# Patient Record
Sex: FEMALE | Race: WHITE
[De-identification: names, ages, dates, MRNs, and addresses within clinical notes are randomized per-mention and may not be internally consistent; named-entity substitution may affect disease eponyms.]

---

## 2017-12-04 ENCOUNTER — TRANSCRIPTION ENCOUNTER (OUTPATIENT)
Age: 57
End: 2017-12-04

## 2017-12-07 ENCOUNTER — TRANSCRIPTION ENCOUNTER (OUTPATIENT)
Age: 57
End: 2017-12-07

## 2019-02-08 ENCOUNTER — OUTPATIENT (OUTPATIENT)
Dept: OUTPATIENT SERVICES | Facility: HOSPITAL | Age: 59
LOS: 1 days | Discharge: HOME | End: 2019-02-08

## 2019-02-08 DIAGNOSIS — E87.5 HYPERKALEMIA: ICD-10-CM

## 2020-07-16 ENCOUNTER — INPATIENT (INPATIENT)
Facility: HOSPITAL | Age: 60
LOS: 1 days | Discharge: HOME | End: 2020-07-18
Attending: HOSPITALIST | Admitting: HOSPITALIST
Payer: COMMERCIAL

## 2020-07-16 VITALS
TEMPERATURE: 100 F | HEART RATE: 102 BPM | DIASTOLIC BLOOD PRESSURE: 86 MMHG | SYSTOLIC BLOOD PRESSURE: 186 MMHG | OXYGEN SATURATION: 97 % | RESPIRATION RATE: 18 BRPM

## 2020-07-16 LAB
ALBUMIN SERPL ELPH-MCNC: 4.9 G/DL — SIGNIFICANT CHANGE UP (ref 3.5–5.2)
ALP SERPL-CCNC: 83 U/L — SIGNIFICANT CHANGE UP (ref 30–115)
ALT FLD-CCNC: 38 U/L — SIGNIFICANT CHANGE UP (ref 0–41)
ANION GAP SERPL CALC-SCNC: 17 MMOL/L — HIGH (ref 7–14)
APTT BLD: 34.9 SEC — SIGNIFICANT CHANGE UP (ref 27–39.2)
AST SERPL-CCNC: 28 U/L — SIGNIFICANT CHANGE UP (ref 0–41)
BASOPHILS # BLD AUTO: 0.06 K/UL — SIGNIFICANT CHANGE UP (ref 0–0.2)
BASOPHILS NFR BLD AUTO: 0.4 % — SIGNIFICANT CHANGE UP (ref 0–1)
BILIRUB SERPL-MCNC: 0.3 MG/DL — SIGNIFICANT CHANGE UP (ref 0.2–1.2)
BUN SERPL-MCNC: 19 MG/DL — SIGNIFICANT CHANGE UP (ref 10–20)
CALCIUM SERPL-MCNC: 10.1 MG/DL — SIGNIFICANT CHANGE UP (ref 8.5–10.1)
CHLORIDE SERPL-SCNC: 96 MMOL/L — LOW (ref 98–110)
CHOLEST SERPL-MCNC: 200 MG/DL — SIGNIFICANT CHANGE UP (ref 100–200)
CO2 SERPL-SCNC: 23 MMOL/L — SIGNIFICANT CHANGE UP (ref 17–32)
CREAT SERPL-MCNC: 1.1 MG/DL — SIGNIFICANT CHANGE UP (ref 0.7–1.5)
EOSINOPHIL # BLD AUTO: 0.08 K/UL — SIGNIFICANT CHANGE UP (ref 0–0.7)
EOSINOPHIL NFR BLD AUTO: 0.6 % — SIGNIFICANT CHANGE UP (ref 0–8)
GLUCOSE BLDC GLUCOMTR-MCNC: 112 MG/DL — HIGH (ref 70–99)
GLUCOSE BLDC GLUCOMTR-MCNC: 133 MG/DL — HIGH (ref 70–99)
GLUCOSE SERPL-MCNC: 210 MG/DL — HIGH (ref 70–99)
HCT VFR BLD CALC: 40.9 % — SIGNIFICANT CHANGE UP (ref 37–47)
HDLC SERPL-MCNC: 33 MG/DL — LOW
HGB BLD-MCNC: 13.8 G/DL — SIGNIFICANT CHANGE UP (ref 12–16)
IMM GRANULOCYTES NFR BLD AUTO: 1 % — HIGH (ref 0.1–0.3)
INR BLD: 1 RATIO — SIGNIFICANT CHANGE UP (ref 0.65–1.3)
LIPID PNL WITH DIRECT LDL SERPL: 91 MG/DL — SIGNIFICANT CHANGE UP (ref 4–129)
LYMPHOCYTES # BLD AUTO: 18.5 % — LOW (ref 20.5–51.1)
LYMPHOCYTES # BLD AUTO: 2.47 K/UL — SIGNIFICANT CHANGE UP (ref 1.2–3.4)
MCHC RBC-ENTMCNC: 31.8 PG — HIGH (ref 27–31)
MCHC RBC-ENTMCNC: 33.7 G/DL — SIGNIFICANT CHANGE UP (ref 32–37)
MCV RBC AUTO: 94.2 FL — SIGNIFICANT CHANGE UP (ref 81–99)
MONOCYTES # BLD AUTO: 0.74 K/UL — HIGH (ref 0.1–0.6)
MONOCYTES NFR BLD AUTO: 5.5 % — SIGNIFICANT CHANGE UP (ref 1.7–9.3)
NEUTROPHILS # BLD AUTO: 9.86 K/UL — HIGH (ref 1.4–6.5)
NEUTROPHILS NFR BLD AUTO: 74 % — SIGNIFICANT CHANGE UP (ref 42.2–75.2)
NRBC # BLD: 0 /100 WBCS — SIGNIFICANT CHANGE UP (ref 0–0)
PLATELET # BLD AUTO: 179 K/UL — SIGNIFICANT CHANGE UP (ref 130–400)
POTASSIUM SERPL-MCNC: 4.9 MMOL/L — SIGNIFICANT CHANGE UP (ref 3.5–5)
POTASSIUM SERPL-SCNC: 4.9 MMOL/L — SIGNIFICANT CHANGE UP (ref 3.5–5)
PROT SERPL-MCNC: 7.6 G/DL — SIGNIFICANT CHANGE UP (ref 6–8)
PROTHROM AB SERPL-ACNC: 11.5 SEC — SIGNIFICANT CHANGE UP (ref 9.95–12.87)
RBC # BLD: 4.34 M/UL — SIGNIFICANT CHANGE UP (ref 4.2–5.4)
RBC # FLD: 13.2 % — SIGNIFICANT CHANGE UP (ref 11.5–14.5)
SODIUM SERPL-SCNC: 136 MMOL/L — SIGNIFICANT CHANGE UP (ref 135–146)
TOTAL CHOLESTEROL/HDL RATIO MEASUREMENT: 6.1 RATIO — HIGH (ref 4–5.5)
TRIGL SERPL-MCNC: 744 MG/DL — HIGH (ref 10–149)
TROPONIN T SERPL-MCNC: <0.01 NG/ML — SIGNIFICANT CHANGE UP
WBC # BLD: 13.34 K/UL — HIGH (ref 4.8–10.8)
WBC # FLD AUTO: 13.34 K/UL — HIGH (ref 4.8–10.8)

## 2020-07-16 PROCEDURE — 70450 CT HEAD/BRAIN W/O DYE: CPT | Mod: 26

## 2020-07-16 PROCEDURE — 99291 CRITICAL CARE FIRST HOUR: CPT

## 2020-07-16 PROCEDURE — 93010 ELECTROCARDIOGRAM REPORT: CPT

## 2020-07-16 PROCEDURE — 0042T: CPT

## 2020-07-16 PROCEDURE — 99497 ADVNCD CARE PLAN 30 MIN: CPT | Mod: 25

## 2020-07-16 PROCEDURE — 99223 1ST HOSP IP/OBS HIGH 75: CPT

## 2020-07-16 RX ORDER — ASPIRIN/CALCIUM CARB/MAGNESIUM 324 MG
325 TABLET ORAL ONCE
Refills: 0 | Status: COMPLETED | OUTPATIENT
Start: 2020-07-16 | End: 2020-07-16

## 2020-07-16 RX ORDER — ATORVASTATIN CALCIUM 80 MG/1
80 TABLET, FILM COATED ORAL AT BEDTIME
Refills: 0 | Status: DISCONTINUED | OUTPATIENT
Start: 2020-07-16 | End: 2020-07-18

## 2020-07-16 RX ORDER — INSULIN LISPRO 100/ML
VIAL (ML) SUBCUTANEOUS
Refills: 0 | Status: DISCONTINUED | OUTPATIENT
Start: 2020-07-16 | End: 2020-07-18

## 2020-07-16 RX ORDER — DEXTROSE 50 % IN WATER 50 %
12.5 SYRINGE (ML) INTRAVENOUS ONCE
Refills: 0 | Status: DISCONTINUED | OUTPATIENT
Start: 2020-07-16 | End: 2020-07-18

## 2020-07-16 RX ORDER — GEMFIBROZIL 600 MG
600 TABLET ORAL
Refills: 0 | Status: DISCONTINUED | OUTPATIENT
Start: 2020-07-16 | End: 2020-07-18

## 2020-07-16 RX ORDER — BENAZEPRIL HYDROCHLORIDE 40 MG/1
1 TABLET ORAL
Qty: 0 | Refills: 0 | DISCHARGE

## 2020-07-16 RX ORDER — SODIUM CHLORIDE 9 MG/ML
1000 INJECTION, SOLUTION INTRAVENOUS
Refills: 0 | Status: DISCONTINUED | OUTPATIENT
Start: 2020-07-16 | End: 2020-07-18

## 2020-07-16 RX ORDER — DEXTROSE 50 % IN WATER 50 %
25 SYRINGE (ML) INTRAVENOUS ONCE
Refills: 0 | Status: DISCONTINUED | OUTPATIENT
Start: 2020-07-16 | End: 2020-07-18

## 2020-07-16 RX ORDER — PANTOPRAZOLE SODIUM 20 MG/1
40 TABLET, DELAYED RELEASE ORAL
Refills: 0 | Status: DISCONTINUED | OUTPATIENT
Start: 2020-07-16 | End: 2020-07-18

## 2020-07-16 RX ORDER — CHLORHEXIDINE GLUCONATE 213 G/1000ML
1 SOLUTION TOPICAL
Refills: 0 | Status: DISCONTINUED | OUTPATIENT
Start: 2020-07-16 | End: 2020-07-18

## 2020-07-16 RX ORDER — ATORVASTATIN CALCIUM 80 MG/1
80 TABLET, FILM COATED ORAL ONCE
Refills: 0 | Status: COMPLETED | OUTPATIENT
Start: 2020-07-16 | End: 2020-07-16

## 2020-07-16 RX ORDER — LISINOPRIL 2.5 MG/1
10 TABLET ORAL DAILY
Refills: 0 | Status: DISCONTINUED | OUTPATIENT
Start: 2020-07-16 | End: 2020-07-16

## 2020-07-16 RX ORDER — DEXTROSE 50 % IN WATER 50 %
15 SYRINGE (ML) INTRAVENOUS ONCE
Refills: 0 | Status: DISCONTINUED | OUTPATIENT
Start: 2020-07-16 | End: 2020-07-18

## 2020-07-16 RX ORDER — ASPIRIN/CALCIUM CARB/MAGNESIUM 324 MG
1 TABLET ORAL
Qty: 0 | Refills: 0 | DISCHARGE

## 2020-07-16 RX ORDER — ENOXAPARIN SODIUM 100 MG/ML
40 INJECTION SUBCUTANEOUS AT BEDTIME
Refills: 0 | Status: DISCONTINUED | OUTPATIENT
Start: 2020-07-16 | End: 2020-07-16

## 2020-07-16 RX ORDER — ASPIRIN/CALCIUM CARB/MAGNESIUM 324 MG
81 TABLET ORAL DAILY
Refills: 0 | Status: DISCONTINUED | OUTPATIENT
Start: 2020-07-16 | End: 2020-07-18

## 2020-07-16 RX ORDER — INSULIN GLARGINE 100 [IU]/ML
14 INJECTION, SOLUTION SUBCUTANEOUS AT BEDTIME
Refills: 0 | Status: DISCONTINUED | OUTPATIENT
Start: 2020-07-16 | End: 2020-07-18

## 2020-07-16 RX ORDER — INSULIN LISPRO 100/ML
5 VIAL (ML) SUBCUTANEOUS
Refills: 0 | Status: DISCONTINUED | OUTPATIENT
Start: 2020-07-16 | End: 2020-07-18

## 2020-07-16 RX ORDER — METFORMIN HYDROCHLORIDE 850 MG/1
1 TABLET ORAL
Qty: 0 | Refills: 0 | DISCHARGE

## 2020-07-16 RX ORDER — GLUCAGON INJECTION, SOLUTION 0.5 MG/.1ML
1 INJECTION, SOLUTION SUBCUTANEOUS ONCE
Refills: 0 | Status: DISCONTINUED | OUTPATIENT
Start: 2020-07-16 | End: 2020-07-18

## 2020-07-16 RX ADMIN — ATORVASTATIN CALCIUM 80 MILLIGRAM(S): 80 TABLET, FILM COATED ORAL at 19:53

## 2020-07-16 RX ADMIN — Medication 325 MILLIGRAM(S): at 19:53

## 2020-07-16 NOTE — ED PROVIDER NOTE - CLINICAL SUMMARY MEDICAL DECISION MAKING FREE TEXT BOX
Pt evaluated after sign out. CT reviewed. Neuro evaluated pt and recommend admission to stroke unit. Will admit.

## 2020-07-16 NOTE — ED PROVIDER NOTE - CARE PLAN
Principal Discharge DX:	Numbness and tingling of left side of face Principal Discharge DX:	Numbness and tingling of left side of face  Secondary Diagnosis:	Cerebrovascular accident (CVA), unspecified mechanism

## 2020-07-16 NOTE — H&P ADULT - ATTENDING COMMENTS
PHYSICAL EXAM:    CONSTITUTIONAL: NAD  ENMT: EOMI, PERRLA, left eye watery discharge, No tonsillar erythema, exudates, or enlargement, neck supple, No JVD  PSYCH: Alert & Oriented X3  RESPIRATORY: Clear to percussion bilaterally; No rales, rhonchi, wheezing, or rubs  CARDIOVASCULAR: Regular rate and rhythm; No murmurs, rubs, or gallops, negative edema  GASTROINTESTINAL: Soft, Nontender, Nondistended; Bowel sounds present  EXTREMITIES:  2+ Peripheral Pulses, No clubbing, cyanosis  SKIN: No rashes or lesions  NEURO: Decreased strength in left eye closing CN VII involvement, minimal left lip drooping, left eye     61 yo F with PMHx of HTN, DM II, DLD presented with complaint of sudden onset left upper lip parensthesias since morning of presentation associated with intermittent left eye watery discharge for 3 days duration. Patient attributed symptoms to possible Bell's Palsy as father has had history of it. Patient denies headache, changes in vision, slurred speech, aphasia, insect/tick bites, remaining ROS unrevealing. Stroke code activated in ED, NIHSS 2 at time of arrival, deemed not a tPA candidate.     #TIA vs. CVA vs. Bell's Palsy: Neurology recommendations noted, Stroke unit monitoring, neuro checks per unit, s/p ASA 325mg in ED, continue ASA-81mg, high intensity statin, f/u MRI Brain (administer Ativan prior to imaging), 2D-Echo, f/u HgbA1C%, lipid panel noted, speech and swallow evaluation for need of modified diet, CT Brain perfusion reviewed, #Severe stenosis or occlusion of the A1 segment of the left TERENCE, moderate stenosis of the left vertebral artery proximal V4 segment, and calcific plaque of the carotid siphons with moderate left-sided stenoses: Awaiting Neurosurgery recommendations, f/u dedicated carotid duplex     #2 cm left thyroid nodule noted on CT imaging: please advise outpatient workup upon discharge     #DM II monitor fingersticks, basal bolus insulin if greater than 180    #HTN: lisinopril on hold for permissive hypertension, monitor blood pressures     #DLD: continue high intensity statin and lopid    Patient is full code.    Dispositon: Acute PHYSICAL EXAM:    CONSTITUTIONAL: NAD  ENMT: EOMI, PERRLA, left eye watery discharge, No tonsillar erythema, exudates, or enlargement, neck supple, No JVD  PSYCH: Alert & Oriented X3  RESPIRATORY: Clear to percussion bilaterally; No rales, rhonchi, wheezing, or rubs  CARDIOVASCULAR: Regular rate and rhythm; No murmurs, rubs, or gallops, negative edema  GASTROINTESTINAL: Soft, Nontender, Nondistended; Bowel sounds present  EXTREMITIES:  2+ Peripheral Pulses, No clubbing, cyanosis  SKIN: No rashes or lesions  NEURO: Decreased strength in left eye closing CN VII involvement, minimal left lip drooping, left eye     61 yo F with PMHx of HTN, DM II, DLD presented with complaint of sudden onset left upper lip parensthesias since morning of presentation associated with intermittent left eye watery discharge for 3 days duration. Patient attributed symptoms to possible Bell's Palsy as father has had history of it. Patient denies headache, changes in vision, slurred speech, aphasia, insect/tick bites, remaining ROS unrevealing. Stroke code activated in ED, NIHSS 2 at time of arrival, deemed not a tPA candidate.     #TIA vs. CVA vs. Bell's Palsy: Neurology recommendations noted, Stroke unit monitoring, neuro checks per unit, s/p ASA 325mg in ED, continue ASA-81mg, high intensity statin, f/u MRI Brain (administer Ativan prior to imaging), 2D-Echo, f/u HgbA1C%, lipid panel noted, speech and swallow evaluation for need of modified diet, CT Brain perfusion reviewed, #Severe stenosis or occlusion of the A1 segment of the left TERENCE, moderate stenosis of the left vertebral artery proximal V4 segment, and calcific plaque of the carotid siphons with moderate left-sided stenoses: Awaiting Neurosurgery recommendations, f/u dedicated carotid duplex     #2 cm left thyroid nodule noted on CT imaging: please advise outpatient workup upon discharge     #Leukocytosis, likely reactive: COVID-19 result pending, f/u UA    #DM II monitor fingersticks, basal bolus insulin if greater than 180    #HTN: lisinopril on hold for permissive hypertension, monitor blood pressures     #DLD: continue high intensity statin and lopid    Patient is full code.    Dispositon: Acute PHYSICAL EXAM:    CONSTITUTIONAL: NAD  ENMT: EOMI, PERRLA, left eye watery discharge, No tonsillar erythema, exudates, or enlargement, neck supple, No JVD  PSYCH: Alert & Oriented X3  RESPIRATORY: Clear to percussion bilaterally; No rales, rhonchi, wheezing, or rubs  CARDIOVASCULAR: Regular rate and rhythm; No murmurs, rubs, or gallops, negative edema  GASTROINTESTINAL: Soft, Nontender, Nondistended; Bowel sounds present  EXTREMITIES:  2+ Peripheral Pulses, No clubbing, cyanosis  SKIN: No rashes or lesions  NEURO: Decreased strength in left eye closing CN VII involvement, minimal left lip droopingm, sensation of bilateral face intact    61 yo F with PMHx of HTN, DM II, DLD presented with complaint of sudden onset left upper lip parensthesias since morning of presentation associated with intermittent left eye watery discharge for 3 days duration. Patient attributed symptoms to possible Bell's Palsy as father has had history of it. Patient denies headache, changes in vision, slurred speech, aphasia, insect/tick bites, remaining ROS unrevealing. Stroke code activated in ED, NIHSS 2 at time of arrival, deemed not a tPA candidate.     #TIA vs. CVA vs. Bell's Palsy: Neurology recommendations noted, Stroke unit monitoring, neuro checks per unit, s/p ASA 325mg in ED, continue ASA-81mg, high intensity statin, f/u MRI Brain (administer Ativan prior to imaging), 2D-Echo, f/u HgbA1C%, lipid panel noted, speech and swallow evaluation for need of modified diet, CT Brain perfusion reviewed, #Severe stenosis or occlusion of the A1 segment of the left TERENCE, moderate stenosis of the left vertebral artery proximal V4 segment, and calcific plaque of the carotid siphons with moderate left-sided stenoses: Awaiting Neurosurgery recommendations, f/u dedicated carotid duplex     #2 cm left thyroid nodule noted on CT imaging: please advise outpatient workup upon discharge     #Leukocytosis, likely reactive: COVID-19 result pending, f/u UA    #DM II monitor fingersticks, basal bolus insulin if greater than 180    #HTN: lisinopril on hold for permissive hypertension, monitor blood pressures     #DLD: continue high intensity statin and lopid    Patient is full code.    Dispositon: Acute

## 2020-07-16 NOTE — H&P ADULT - NSICDXFAMILYHX_GEN_ALL_CORE_FT
FAMILY HISTORY:  FH: type 2 diabetes, father FAMILY HISTORY:  FH: type 2 diabetes, father    Father  Still living? Unknown  Family history of Bell's palsy, Age at diagnosis: Age Unknown

## 2020-07-16 NOTE — H&P ADULT - NSHPPHYSICALEXAM_GEN_ALL_CORE
PHYSICAL EXAM:  GENERAL: NAD, lying in bed comfortably  HEAD:  Atraumatic, Normocephalic; able to wrinkle forehead  EYES: EOMI, PERRLA, conjunctiva and sclera clear; left eye unable to close fully  ENT: Moist mucous membranes; left sided facial droop  NECK: Supple, No JVD  CHEST/LUNG: Clear to auscultation bilaterally; No rales, rhonchi, wheezing, or rubs. Unlabored respirations  HEART: Regular rate and rhythm; No murmurs, rubs, or gallops  ABDOMEN: Bowel sounds present; Soft, Nontender, Nondistended. No hepatomegaly  EXTREMITIES:  2+ Peripheral Pulses, brisk capillary refill. No clubbing, cyanosis, or edema  NERVOUS SYSTEM:  Alert & Oriented X3, speech clear. decreased sensation to pinprick on left side of face   MSK: FROM all 4 extremities, full and equal strength  SKIN: No rashes or lesions

## 2020-07-16 NOTE — ED PROVIDER NOTE - PROGRESS NOTE DETAILS
Dr. Duran: Sign out to Dr. Preciado  Patient pending neuro recommendations. Pt evaluated after sign out. CT reviewed. Neuro evaluated pt and recommend admission to stroke unit. Will admit.

## 2020-07-16 NOTE — H&P ADULT - NSHPREVIEWOFSYSTEMS_GEN_ALL_CORE
REVIEW OF SYSTEMS:    CONSTITUTIONAL: +weakness/numbness in left side of face  EYES/ENT: No visual changes;  No vertigo or throat pain   NECK: No pain or stiffness  RESPIRATORY: No cough, wheezing, hemoptysis; No shortness of breath  CARDIOVASCULAR: No chest pain or palpitations  GASTROINTESTINAL: No abdominal or epigastric pain. No nausea, vomiting, or hematemesis; No diarrhea or constipation. No melena or hematochezia.  GENITOURINARY: No dysuria, frequency or hematuria  NEUROLOGICAL: +facial droop  SKIN: No itching, rashes

## 2020-07-16 NOTE — H&P ADULT - ASSESSMENT
61 yo female with pmh of HTN, DM, DLD presents with acute onset of left lower facial weakness and numbness since this morning. Stroke code was called, NIHSS was 2 for facial weakness and numbness. CTH is negative for acute pathology and CTP showed no penumbra, CTA with multifocal intracranial stenosis. LWK is 10 am, so she was not a tPA candidate.     #R/o stroke  -s/p 325 asa, 80 statin in ed  -start asa 81mg, statin 80  -imaging reviewed  -admit to stroke unit  -q2 neuro checks  -keep syst 140-160  -MR brain NC; give ativan 2mg before going  -TTE with bubble study  -lipid profile, hba1c  -speech/swallow eval pending; npo   -neurosx consult for severe stenosis or occlusion of the A1 segment of the left TERENCE    #DM2  -lantus 14qhs, lispro 5 qac  -f/u fs  -avoid hypoglycemia  -check a1c     #HTN  -lisinopril 20mg  -keep syst 140-160    #DLD  -lipitor 80mg q24h  -continue gemfibrozil    #DVT PPx- LVX 40sc qhs  #GI PPx- Protonix 40mg qd  #Diet- NPO pending sp/sw  #CHG  #Activity- Bedrest  #Dispo- Stroke unit  #Code- FULL 61 yo female with pmh of HTN, DM, DLD presents with acute onset of left lower facial weakness and numbness since this morning. Stroke code was called, NIHSS was 2 for facial weakness and numbness. CTH is negative for acute pathology and CTP showed no penumbra, CTA with multifocal intracranial stenosis. LWK is 10 am, so she was not a tPA candidate.     #R/o stroke  -s/p 325 asa, 80 statin in ed  -start asa 81mg, statin 80  -imaging reviewed  -admit to stroke unit  -q2 neuro checks  -keep syst 140-160  -MR brain NC; give ativan 2mg before going  -TTE with bubble study  -lipid profile, hba1c  -speech/swallow eval pending; npo   -neurosx consult for severe stenosis or occlusion of the A1 segment of the left TERENCE    #DM2  -lantus 14qhs, lispro 5 qac  -f/u fs  -avoid hypoglycemia  -check a1c     #HTN  -lisinopril 10mg qd  -keep syst 140-160    #DLD  -lipitor 80mg q24h  -continue gemfibrozil    #2cm left thyroid nodule  -f/u as outpatient, will need thyroid u/s    #DVT PPx- LVX 40sc qhs  #GI PPx- Protonix 40mg qd  #Diet- NPO pending sp/sw  #CHG  #Activity- Bedrest  #Dispo- Stroke unit  #Code- FULL 61 yo female with pmh of HTN, DM, DLD presents with acute onset of left lower facial weakness and numbness since this morning. Stroke code was called, NIHSS was 2 for facial weakness and numbness. CTH is negative for acute pathology and CTP showed no penumbra, CTA with multifocal intracranial stenosis. LWK is 10 am, so she was not a tPA candidate.     #R/o stroke  -s/p 325 asa, 80 statin in ed  -start asa 81mg, statin 80  -imaging reviewed  -admit to stroke unit  -q1 neuro checks  -keep syst 140-160  -MR brain NC; give ativan 2mg before going  -TTE with bubble study  -lipid profile, hba1c  -speech/swallow eval pending; npo   -neurosx consult for severe stenosis or occlusion of the A1 segment of the left TERENCE    #DM2  -lantus 14qhs, lispro 5 qac  -f/u fs  -avoid hypoglycemia  -check a1c     #HTN  -lisinopril 10mg qd  -keep syst 140-160    #DLD  -lipitor 80mg q24h  -continue gemfibrozil    #2cm left thyroid nodule  -f/u as outpatient, will need thyroid u/s    #DVT PPx- LVX 40sc qhs  #GI PPx- Protonix 40mg qd  #Diet- NPO pending sp/sw  #CHG  #Activity- Bedrest  #Dispo- Stroke unit  #Code- FULL 59 yo female with pmh of HTN, DM, DLD presents with acute onset of left lower facial weakness and numbness since this morning. Stroke code was called, NIHSS was 2 for facial weakness and numbness. CTH is negative for acute pathology and CTP showed no penumbra, CTA with multifocal intracranial stenosis. LWK is 10 am, so she was not a tPA candidate.     #R/o stroke  -s/p 325 asa, 80 statin in ed  -start asa 81mg, statin 80  -imaging reviewed  -admit to stroke unit  -q1 neuro checks  -keep syst 140-160  -MR brain NC; give ativan 2mg before going  -TTE with bubble study  -lipid profile, hba1c  -speech/swallow eval pending; npo   -neurosx consult for severe stenosis or occlusion of the A1 segment of the left TERENCE    #DM2  -lantus 14qhs, lispro 5 qac  -f/u fs  -avoid hypoglycemia  -check a1c     #HTN  -lisinopril 10mg qd *on hold  -keep syst 140-160    #DLD  -lipitor 80mg q24h  -continue gemfibrozil    #2cm left thyroid nodule  -f/u as outpatient, will need thyroid u/s    #DVT PPx- LVX 40sc qhs  #GI PPx- Protonix 40mg qd  #Diet- NPO pending sp/sw  #CHG  #Activity- Bedrest  #Dispo- Stroke unit  #Code- FULL 59 yo female with pmh of HTN, DM, DLD presents with acute onset of left lower facial weakness and numbness since this morning. Stroke code was called, NIHSS was 2 for facial weakness and numbness. CTH is negative for acute pathology and CTP showed no penumbra, CTA with multifocal intracranial stenosis. LWK is 10 am, so she was not a tPA candidate.     #R/o stroke  -s/p 325 asa, 80 statin in ed  -start asa 81mg, statin 80  -imaging reviewed  -admit to stroke unit  -q1 neuro checks  -keep syst 140-160  -MR brain NC; give ativan 2mg before going  -TTE with bubble study  -lipid profile, hba1c  -speech/swallow eval pending; npo   -neurosx consult for severe stenosis or occlusion of the A1 segment of the left TERENCE    #DM2  -lantus 14qhs, lispro 5 qac  -f/u fs  -avoid hypoglycemia  -check a1c     #HTN  -lisinopril 10mg qd *on hold  -keep syst 140-160    #DLD  -lipitor 80mg q24h  -continue gemfibrozil    #2cm left thyroid nodule  -f/u as outpatient, will need thyroid u/s    #DVT PPx- *anti thrombotic sequentials   #GI PPx- Protonix 40mg qd  #Diet- NPO pending sp/sw  #CHG  #Activity- *Increase as tolerated  #Dispo- Stroke unit  #Code- FULL

## 2020-07-16 NOTE — ED PROVIDER NOTE - ATTENDING CONTRIBUTION TO CARE
61 yo F with PMH of HTN, DLD presents to ED for L sided numbness that began at 10am. Patient states over the past 3 days her eye has been tearing. No slurred speak, no weakness. No fever, chills.     Const: Well nourished, well developed, appears stated age  Eyes: tearing L eye   HENT:  Neck supple without meningismus   CV: RRR, Warm, well-perfused extremities  RESP: CTA B/L, no tachypnea   GI: soft, non-tender, non-distended  MSK: No gross deformities appreciated  Skin: Warm, dry. No rashes  Neuro: Alert, CNs II-XII grossly intact. Sensation and motor function of extremities grossly intact. No facial droop.  Psych: Appropriate mood and affect.    Stroke code

## 2020-07-16 NOTE — ED PROVIDER NOTE - OBJECTIVE STATEMENT
Pt is a 60y w/ no PMHX of HTN, DLD presenting w/ Left sided facial numbness since 10am. Of note pt has also noted tearing of her L eye for the last 3 days. Denies any n/v/fevers ,chills, no headache, or neck stiffness. No vision changes. No previous trauma. Pt is a 60y w/ no PMHX of HTN, DLD presenting w/ Left sided facial numbness since 10am. Of note pt has also noted tearing of her L eye for the last 3 days. No focal weakness, no numbness in the extremetie. Denies any n/v/fevers ,chills, no headache, or neck stiffness. No vision changes. No previous trauma.

## 2020-07-16 NOTE — H&P ADULT - NSHPLABSRESULTS_GEN_ALL_CORE
07-16    136  |  96<L>  |  19  ----------------------------<  210<H>  4.9   |  23  |  1.1    Ca    10.1      16 Jul 2020 16:25    TPro  7.6  /  Alb  4.9  /  TBili  0.3  /  DBili  x   /  AST  28  /  ALT  38  /  AlkPhos  83  07-16    CBC Full  -  ( 16 Jul 2020 16:25 )  WBC Count : 13.34 K/uL  RBC Count : 4.34 M/uL  Hemoglobin : 13.8 g/dL  Hematocrit : 40.9 %  Platelet Count - Automated : 179 K/uL  Mean Cell Volume : 94.2 fL  Mean Cell Hemoglobin : 31.8 pg  Mean Cell Hemoglobin Concentration : 33.7 g/dL  Auto Neutrophil # : 9.86 K/uL  Auto Lymphocyte # : 2.47 K/uL  Auto Monocyte # : 0.74 K/uL  Auto Eosinophil # : 0.08 K/uL  Auto Basophil # : 0.06 K/uL  Auto Neutrophil % : 74.0 %  Auto Lymphocyte % : 18.5 %  Auto Monocyte % : 5.5 %  Auto Eosinophil % : 0.6 %  Auto Basophil % : 0.4 %    PT/INR - ( 16 Jul 2020 16:25 )   PT: 11.50 sec;   INR: 1.00 ratio         PTT - ( 16 Jul 2020 16:25 )  PTT:34.9 sec    Lipid Profile (07.16.20 @ 16:25)    Total Cholesterol/HDL Ratio Measurement: 6.1 Ratio    Cholesterol, Serum: 200 mg/dL    Triglycerides, Serum: 744 mg/dL    HDL Cholesterol, Serum: 33: HDL Levels >/= 60 mg/dL are considered beneficial and a "negative" risk  factor.  Effective 08/15/2018: New reference range and interpretive comment. mg/dL    Direct LDL: 91: LDL Cholesterol (mg/dL) --- Interpretive Comment (for adults 18 and over)  Optimal LDL Level may vary based on clinical situation  Below 70                   Ideal for people at very high risk of heart  disease  Below 100                  Ideal for people at risk of heart disease  100 - 129                    [place]  130 - 159                    Borderline high  160 - 189                    High  190 and Above           Very high mg/dL    Troponin T, Serum: <0.01 ng/mL (07.16.20 @ 16:25)    < from: 12 Lead ECG (07.16.20 @ 17:16) >    Diagnosis Line Normal sinus rhythm  Normal ECG    < end of copied text >    < from: CT Brain Stroke Protocol (07.16.20 @ 16:36) >    IMPRESSION:  No acute intracranial pathology. No evidence of midline shift, mass effect or intracranial hemorrhage.    Enlarged pituitary gland which measures approximately 1.0 cm in craniocaudal extent possibly representing an underlying adenoma. Follow-up dynamic MRI of the sella can be obtained for further evaluation.    These findings were discussed with ANALI Olvera of stroke neurology at 7/16/2020 4:38 PM by Dr. Zaragoza with read back confirmation.        < end of copied text >    < from: CT Perfusion w/ Maps w/ IV Cont (07.16.20 @ 16:45) >    IMPRESSION:    1.  Severe stenosis or occlusion of the A1 segment of the left TERENCE. There is opacification of the distal A1 segment and the A2 segment via the anterior communicating artery. No corresponding perfusionabnormality is noted, and this may be a chronic finding.    2.  Otherwise no evidence of major vascular stenosis or occlusion.    3.  Scattered atheromatous changes including mild (<50%) stenosis of the left ICA origin, moderate stenosis of the left vertebral artery proximal V4 segment, and calcific plaque of the carotid siphons with moderate left-sided stenoses.    4.  2 cm left thyroid nodule, recommend further evaluation with thyroid sonogram on an outpatient/elective basis.    < end of copied text >

## 2020-07-16 NOTE — ED PROVIDER NOTE - PHYSICAL EXAMINATION
CONSTITUTIONAL: Well-developed; well-nourished; in no acute distress.   SKIN: warm, dry  HEAD: Normocephalic; atraumatic.  EYES: PERRL, EOMI, normal sclera and conjunctiva   ENT: No nasal discharge; airway clear.  NECK: Supple; non tender.  CARD:  Regular rate and rhythm.   RESP: NO inc WOB   ABD: soft ntnd  EXT: Normal ROM.    LYMPH: No acute cervical adenopathy.  NEURO: Alert, oriented, grossly unremarkable  PSYCH: Cooperative, appropriate.  NEURO: AAO x 3, normal speech, no facial asymmetry, negative pronator drift, no ataxia, negative Romberg, no dysdiadokinesia, no nystagmus, peripheral vision intact, sensory equal and intact.     Decreased sensation in the L side of the face compared to R.   NIHSS 1

## 2020-07-16 NOTE — H&P ADULT - NSHPSOCIALHISTORY_GEN_ALL_CORE
, lives with son and   current smoker, 1/2ppd for 30 years  no alcohol or drugs , lives with son and   current smoker, 1/2ppd for 30 years  no alcohol or illicit drugs

## 2020-07-16 NOTE — H&P ADULT - HISTORY OF PRESENT ILLNESS
61 yo female with pmh of HTN, DM, DLD presents with acute onset of left lower facial weakness and numbness since this morning. She said yesterday her left eye began tearing, was unable to close it. This morning, around 10AM, she began to feel like the left side of her face was numb and her lip was drooping. She denies any chest pain, palpitations, dizziness, headaches, incontinence, abdominal pain, fevers, diarrhea, nausea, vomiting, vision or hearing changes. She has never had symptoms like this before.  ED Course:   T 100F, P 102, /86, R 18, S 97% on RA at rest. Stroke code was called, NIHSS was 2 for facial weakness and numbness. CTH is negative for acute pathology and CTP showed no penumbra, CTA with multifocal intracranial stenosis.  LWK is 10 am, so she was not a tPA candidate. Loaded with aspirin and statin in ED.

## 2020-07-17 LAB
A1C WITH ESTIMATED AVERAGE GLUCOSE RESULT: 8.1 % — HIGH (ref 4–5.6)
ALBUMIN SERPL ELPH-MCNC: 4.5 G/DL — SIGNIFICANT CHANGE UP (ref 3.5–5.2)
ALP SERPL-CCNC: 75 U/L — SIGNIFICANT CHANGE UP (ref 30–115)
ALT FLD-CCNC: 31 U/L — SIGNIFICANT CHANGE UP (ref 0–41)
ANION GAP SERPL CALC-SCNC: 11 MMOL/L — SIGNIFICANT CHANGE UP (ref 7–14)
AST SERPL-CCNC: 24 U/L — SIGNIFICANT CHANGE UP (ref 0–41)
BASOPHILS # BLD AUTO: 0.06 K/UL — SIGNIFICANT CHANGE UP (ref 0–0.2)
BASOPHILS NFR BLD AUTO: 0.5 % — SIGNIFICANT CHANGE UP (ref 0–1)
BILIRUB SERPL-MCNC: 0.6 MG/DL — SIGNIFICANT CHANGE UP (ref 0.2–1.2)
BUN SERPL-MCNC: 16 MG/DL — SIGNIFICANT CHANGE UP (ref 10–20)
CALCIUM SERPL-MCNC: 9.8 MG/DL — SIGNIFICANT CHANGE UP (ref 8.5–10.1)
CHLORIDE SERPL-SCNC: 98 MMOL/L — SIGNIFICANT CHANGE UP (ref 98–110)
CHOLEST SERPL-MCNC: 180 MG/DL — SIGNIFICANT CHANGE UP (ref 100–200)
CO2 SERPL-SCNC: 29 MMOL/L — SIGNIFICANT CHANGE UP (ref 17–32)
CREAT SERPL-MCNC: 0.9 MG/DL — SIGNIFICANT CHANGE UP (ref 0.7–1.5)
EOSINOPHIL # BLD AUTO: 0.1 K/UL — SIGNIFICANT CHANGE UP (ref 0–0.7)
EOSINOPHIL NFR BLD AUTO: 0.8 % — SIGNIFICANT CHANGE UP (ref 0–8)
ESTIMATED AVERAGE GLUCOSE: 186 MG/DL — HIGH (ref 68–114)
GLUCOSE BLDC GLUCOMTR-MCNC: 178 MG/DL — HIGH (ref 70–99)
GLUCOSE BLDC GLUCOMTR-MCNC: 254 MG/DL — HIGH (ref 70–99)
GLUCOSE BLDC GLUCOMTR-MCNC: 256 MG/DL — HIGH (ref 70–99)
GLUCOSE SERPL-MCNC: 143 MG/DL — HIGH (ref 70–99)
HCT VFR BLD CALC: 38.3 % — SIGNIFICANT CHANGE UP (ref 37–47)
HCV AB S/CO SERPL IA: 0.04 COI — SIGNIFICANT CHANGE UP
HCV AB SERPL-IMP: SIGNIFICANT CHANGE UP
HDLC SERPL-MCNC: 30 MG/DL — LOW
HGB BLD-MCNC: 13.1 G/DL — SIGNIFICANT CHANGE UP (ref 12–16)
IMM GRANULOCYTES NFR BLD AUTO: 0.9 % — HIGH (ref 0.1–0.3)
LIPID PNL WITH DIRECT LDL SERPL: 90 MG/DL — SIGNIFICANT CHANGE UP (ref 4–129)
LYMPHOCYTES # BLD AUTO: 2.79 K/UL — SIGNIFICANT CHANGE UP (ref 1.2–3.4)
LYMPHOCYTES # BLD AUTO: 23.6 % — SIGNIFICANT CHANGE UP (ref 20.5–51.1)
MAGNESIUM SERPL-MCNC: 2.1 MG/DL — SIGNIFICANT CHANGE UP (ref 1.8–2.4)
MCHC RBC-ENTMCNC: 31.4 PG — HIGH (ref 27–31)
MCHC RBC-ENTMCNC: 34.2 G/DL — SIGNIFICANT CHANGE UP (ref 32–37)
MCV RBC AUTO: 91.8 FL — SIGNIFICANT CHANGE UP (ref 81–99)
MONOCYTES # BLD AUTO: 0.83 K/UL — HIGH (ref 0.1–0.6)
MONOCYTES NFR BLD AUTO: 7 % — SIGNIFICANT CHANGE UP (ref 1.7–9.3)
NEUTROPHILS # BLD AUTO: 7.92 K/UL — HIGH (ref 1.4–6.5)
NEUTROPHILS NFR BLD AUTO: 67.2 % — SIGNIFICANT CHANGE UP (ref 42.2–75.2)
NRBC # BLD: 0 /100 WBCS — SIGNIFICANT CHANGE UP (ref 0–0)
PLATELET # BLD AUTO: 165 K/UL — SIGNIFICANT CHANGE UP (ref 130–400)
POTASSIUM SERPL-MCNC: 5.1 MMOL/L — HIGH (ref 3.5–5)
POTASSIUM SERPL-SCNC: 5.1 MMOL/L — HIGH (ref 3.5–5)
PROT SERPL-MCNC: 6.9 G/DL — SIGNIFICANT CHANGE UP (ref 6–8)
RBC # BLD: 4.17 M/UL — LOW (ref 4.2–5.4)
RBC # FLD: 13.1 % — SIGNIFICANT CHANGE UP (ref 11.5–14.5)
SARS-COV-2 RNA SPEC QL NAA+PROBE: SIGNIFICANT CHANGE UP
SODIUM SERPL-SCNC: 138 MMOL/L — SIGNIFICANT CHANGE UP (ref 135–146)
TOTAL CHOLESTEROL/HDL RATIO MEASUREMENT: 6 RATIO — HIGH (ref 4–5.5)
TRIGL SERPL-MCNC: 540 MG/DL — HIGH (ref 10–149)
WBC # BLD: 11.81 K/UL — HIGH (ref 4.8–10.8)
WBC # FLD AUTO: 11.81 K/UL — HIGH (ref 4.8–10.8)

## 2020-07-17 PROCEDURE — 93880 EXTRACRANIAL BILAT STUDY: CPT | Mod: 26

## 2020-07-17 PROCEDURE — 99233 SBSQ HOSP IP/OBS HIGH 50: CPT

## 2020-07-17 PROCEDURE — 70551 MRI BRAIN STEM W/O DYE: CPT | Mod: 26

## 2020-07-17 PROCEDURE — 93306 TTE W/DOPPLER COMPLETE: CPT | Mod: 26

## 2020-07-17 RX ORDER — HEPARIN SODIUM 5000 [USP'U]/ML
5000 INJECTION INTRAVENOUS; SUBCUTANEOUS EVERY 12 HOURS
Refills: 0 | Status: DISCONTINUED | OUTPATIENT
Start: 2020-07-17 | End: 2020-07-18

## 2020-07-17 RX ADMIN — Medication 3: at 12:50

## 2020-07-17 RX ADMIN — Medication 600 MILLIGRAM(S): at 12:50

## 2020-07-17 RX ADMIN — Medication 5 UNIT(S): at 17:58

## 2020-07-17 RX ADMIN — PANTOPRAZOLE SODIUM 40 MILLIGRAM(S): 20 TABLET, DELAYED RELEASE ORAL at 12:50

## 2020-07-17 RX ADMIN — ATORVASTATIN CALCIUM 80 MILLIGRAM(S): 80 TABLET, FILM COATED ORAL at 21:28

## 2020-07-17 RX ADMIN — Medication 81 MILLIGRAM(S): at 12:50

## 2020-07-17 RX ADMIN — Medication 1: at 17:58

## 2020-07-17 RX ADMIN — Medication 5 UNIT(S): at 12:50

## 2020-07-17 RX ADMIN — HEPARIN SODIUM 5000 UNIT(S): 5000 INJECTION INTRAVENOUS; SUBCUTANEOUS at 17:57

## 2020-07-17 RX ADMIN — INSULIN GLARGINE 14 UNIT(S): 100 INJECTION, SOLUTION SUBCUTANEOUS at 21:28

## 2020-07-17 NOTE — SWALLOW BEDSIDE ASSESSMENT ADULT - SWALLOW EVAL: RECOMMENDED FEEDING/EATING TECHNIQUES
oral hygiene/position upright (90 degrees)/eat/drink on unaffected side (Right), continue use of tongue sweep as needed for left sided residue

## 2020-07-17 NOTE — CONSULT NOTE ADULT - SUBJECTIVE AND OBJECTIVE BOX
Stroke Progress Note:    ASHLEY MIRANDA    1. Chief Complaint: left facial palsy and numbness    HPI: 59 yo female with pmh of HTN, DM, DLD presents with acute onset of left lower facial weakness and numbness since this morning. 2 days prior she noted to have watery discharge from her left eye and currently she feels its different from her right.   IN ED stroke code was called, NIHSS is 2 for facial weakness and numbness. She also has mild upper left facial weakness as well. CTH is negative for acute pathology and CTP showed no penumbra, CTA with multifocal intracranial stenosis, see below.   LWK is 10 am, so she is not a tPA candidate.      2. Relevant PMH:   Prior ischemic stroke/TIA[ ], Afib [ ], CAD [ ], HTN [x ], DLD [x ], DM [ x], PVD [ ], Obesity [ ],   Sedentary lifestyle [ ], CHF [ ], ADA [ ], Cancer Hx [ ].    3. Social History: Smoking [ ], Drug Use [ ], Alcohol Use [ ], Other [ ]    4. Possible Location of Stroke:    5. Relevant Brain Tissue Imagin. Relevant Cerebrovascular Imaging:         CT Perfusion w/ Maps w/ IV Cont:   EXAM:  CT PERFUSION W MAPS IC            PROCEDURE DATE:  2020            INTERPRETATION:  Clinical History / Reason for exam: Stroke code    Technique: CT angiogram of the head and neck including perfusion study of the brain.  Contiguous CT axial images of the head and neck were obtained following the bolus intravenous administration of 120 cc Optiray with multiple 3-D and MIP reformats with perfusion mapping performed on a separate 3D workstation ([id number]).    Correlation made with accompanying noncontrast head CT.    Findings:    CTA neck:   The visualized aortic arch and great vessel origins are patent.      The right common, internal and external carotid arteries are patent.    The left common, internal and external carotid arteries are patent. There is calcific plaque along the posterior wall of the left carotid bulb with mild (less than 50%) stenosis.    The vertebral arteries are patent, with mild stenoses of the left vertebral artery throughout its cervical course.    CTA brain: There is calcific plaque of the carotid siphons with moderate stenoses of the left precavernous, cavernous and supraclinoid segments, and mild stenosis of the right supraclinoid segment.    The right TERENCE and MCA are patent.    The left MCA is patent. There is severe stenosis or occlusion of the proximal A1 segment of the left TERENCE.    The distal vertebral arteries demonstrate atheromatous changes with mild stenosis of the right proximal V4 segment and moderate stenosis of the left proximal V4 segment. The basilar artery is patent. The posterior cerebral arteries are patent.    Perfusion: Apparent perfusion abnormality in the right posterior fossa with Tmax >6s volume of 7 cc is likely artifactual. There is no evidence of diminished cerebral blood flow.    Other:   Moderate-severe cervical spine degenerative changes  2 cm left thyroid lobe nodule.  Partially visualized 3.4 cm ovoid subcutaneous lesion within the back to the right of midline, recommend correlation with exam.  Redemonstrated mild pituitary prominence.    IMPRESSION:    1.  Severe stenosis or occlusion of the A1 segment of the left TERENCE. There is opacification of the distal A1 segment and the A2 segment via the anterior communicating artery. No corresponding perfusionabnormality is noted, and this may be a chronic finding.    2.  Otherwise no evidence of major vascular stenosis or occlusion.    3.  Scattered atheromatous changes including mild (<50%) stenosis of the left ICA origin, moderate stenosis of the left vertebral artery proximal V4 segment, and calcific plaque of the carotid siphons with moderate left-sided stenoses.    4.  2 cm left thyroid nodule, recommend further evaluation with thyroid sonogram on an outpatient/elective basis.        CHRISTOPHE MAI M.D., ATTENDING RADIOLOGIST  This document has been electronically signed. 2020  5:30PM             (20 @ 16:45)         7. Relevant blood tests: Direct LDL: 91 mg/dL [4 - 129] (20 @ 16:25)       8. Relevant cardiac rhythm monitorin. Relevant Cardiac Structure: (TTE/ANTONIO +/-):[ ]No intracardiac thrombus/[ ] no vegetation/[ ]no [place]/EF:    Home Medications:      MEDICATIONS  (STANDING):      10. PT/OT/Speech/Rehab/S&Sw/ Cognitive eval results and recommendations:    11. Exam:    Vital Signs Last 24 Hrs  T(C): 37.8 (2020 16:07), Max: 37.8 (2020 16:07)  T(F): 100 (2020 16:07), Max: 100 (2020 16:07)  HR: 102 (2020 16:45) (102 - 102)  BP: 159/72 (2020 16:45) (159/72 - 186/86)  BP(mean): --  RR: 18 (2020 16:45) (18 - 18)  SpO2: 99% (2020 16:45) (97% - 99%)    12.   NIH STROKE SCALE  Item	                                                        Score  1 a.	Level of Consciousness	               	0  1 b. LOC Questions	                                0  1 c.	LOC Commands	                               	0  2.	Best Gaze	                                        0  3.	Visual	                                                0  4.	Facial Palsy	                                        1  5 a.	Motor Arm - Left	                                0  5 b.	Motor Arm - Right	                        0  6 a.	Motor Leg - Left	                                0  6 b.	Motor Leg - Right	                                0  7.	Limb Ataxia	                                        0  8.	Sensory	                                                1  9.	Language	                                        0  10.	Dysarthria	                                        0  11.	Extinction and Inattention  	        0  ______________________________________  TOTAL	                                                        2    Total NIHSS on admission:      NIHSS yesterday:      NIHSS today: 2    mRS:  0 No symptoms at all  1 No significant disability despite symptoms; able to carry out all usual duties and activities without assistance  2 Slight disability; unable to carry out all previous activities, but able to look after own affairs  3 Moderate disability; requiring some help, but able to walk without assistance  4 Moderately severe disability; unable to walk without assistance and unable to attend to own bodily needs without assistance  5 Severe disability; bedridden, incontinent and requiring constant nursing care and attention  6 Dead
HPI:  61 yo female with pmh of HTN, DM, DLD presents with acute onset of left lower facial weakness and numbness since this morning. She said yesterday her left eye began tearing, was unable to close it. This morning, around 10AM, she began to feel like the left side of her face was numb and her lip was drooping. She denies any chest pain, palpitations, dizziness, headaches, incontinence, abdominal pain, fevers, diarrhea, nausea, vomiting, vision or hearing changes. She has never had symptoms like this before.  ED Course:   T 100F, P 102, /86, R 18, S 97% on RA at rest. Stroke code was called, NIHSS was 2 for facial weakness and numbness. CTH is negative for acute pathology and CTP showed no penumbra, CTA with multifocal intracranial stenosis.  LWK is 10 am, so she was not a tPA candidate. Loaded with aspirin and statin in ED. (16 Jul 2020 22:01)      PAST MEDICAL & SURGICAL HISTORY:  Diabetes  Hypertension  Hyperlipidemia  No significant past surgical history      Hospital Course:    TODAY'S SUBJECTIVE & REVIEW OF SYMPTOMS:     Constitutional WNL   Cardio WNL   Resp WNL   GI WNL  Heme WNL  Endo WNL  Skin WNL  MSK WNL  Neuro left facial droop/ numbness  Cognitive WNL  Psych WNL      MEDICATIONS  (STANDING):  aspirin  chewable 81 milliGRAM(s) Oral daily  atorvastatin 80 milliGRAM(s) Oral at bedtime  chlorhexidine 4% Liquid 1 Application(s) Topical <User Schedule>  dextrose 5%. 1000 milliLiter(s) (50 mL/Hr) IV Continuous <Continuous>  dextrose 50% Injectable 12.5 Gram(s) IV Push once  dextrose 50% Injectable 25 Gram(s) IV Push once  dextrose 50% Injectable 25 Gram(s) IV Push once  gemfibrozil 600 milliGRAM(s) Oral <User Schedule>  insulin glargine Injectable (LANTUS) 14 Unit(s) SubCutaneous at bedtime  insulin lispro (HumaLOG) corrective regimen sliding scale   SubCutaneous three times a day before meals  insulin lispro Injectable (HumaLOG) 5 Unit(s) SubCutaneous three times a day before meals  pantoprazole    Tablet 40 milliGRAM(s) Oral before breakfast    MEDICATIONS  (PRN):  dextrose 40% Gel 15 Gram(s) Oral once PRN Blood Glucose LESS THAN 70 milliGRAM(s)/deciliter  glucagon  Injectable 1 milliGRAM(s) IntraMuscular once PRN Glucose LESS THAN 70 milligrams/deciliter  LORazepam   Injectable 2 milliGRAM(s) IV Push once PRN Anxiety      FAMILY HISTORY:  Family history of Bell's palsy (Father)  FH: type 2 diabetes: father      Allergies    No Known Allergies    Intolerances        SOCIAL HISTORY:    [  ] Etoh  [  ] Smoking  [  ] Substance abuse     Home Environment:  [  ] Home Alone  [x  ] Lives with Family  [  ] Home Health Aid    Dwelling:  [  ] Apartment  [x  ] Private House  [  ] Adult Home  [  ] Skilled Nursing Facility      [  ] Short Term  [  ] Long Term  [x  ] Stairs       Elevator [  ]    FUNCTIONAL STATUS PTA: (Check all that apply)  Ambulation: [ x  ]Independent    [  ] Dependent     [  ] Non-Ambulatory  Assistive Device: [  ] SA Cane  [  ]  Q Cane  [  ] Walker  [  ]  Wheelchair  ADL : [x  ] Independent  [  ]  Dependent       Vital Signs Last 24 Hrs  T(C): 36.9 (17 Jul 2020 10:09), Max: 37.8 (16 Jul 2020 16:07)  T(F): 98.4 (17 Jul 2020 10:09), Max: 100 (16 Jul 2020 16:07)  HR: 80 (17 Jul 2020 10:09) (68 - 102)  BP: 135/61 (17 Jul 2020 10:09) (132/63 - 186/86)  BP(mean): --  RR: 18 (17 Jul 2020 10:09) (18 - 18)  SpO2: 98% (17 Jul 2020 10:09) (97% - 99%)      PHYSICAL EXAM: Alert & Oriented X3  GENERAL: NAD, well-groomed, well-developed  HEAD:  Atraumatic, Normocephalic  CHEST/LUNG: Clear   HEART: S1S2+  ABDOMEN: Soft, Nontender  EXTREMITIES:  no calf tenderness    NERVOUS SYSTEM:  Cranial Nerves 2-12 intact [  ] Abnormal  [x  ]left facial droop  ROM: WFL all extremities [  ]  Abnormal [  ]  Motor Strength: WFL all extremities  [  ]  Abnormal [  ]  Sensation: intact to light touch [  ] Abnormal [x  ]decreased light touch left face  Reflexes: Symmetric [  ]  Abnormal [  ]    FUNCTIONAL STATUS:  Bed Mobility: Independent [  ]  Supervision [x  ]  Needs Assistance [  ]  N/A [  ]  Transfers: Independent [  ]  Supervision [x  ]  Needs Assistance [  ]  N/A [  ]   Ambulation: Independent [  ]  Supervision [  ]  Needs Assistance [  ]  N/A [  ]  ADL: Independent [  ] Requires Assistance [  ] N/A [  ]      LABS:                        13.1   11.81 )-----------( 165      ( 17 Jul 2020 05:34 )             38.3     07-17    138  |  98  |  16  ----------------------------<  143<H>  5.1<H>   |  29  |  0.9    Ca    9.8      17 Jul 2020 05:34  Mg     2.1     07-17    TPro  6.9  /  Alb  4.5  /  TBili  0.6  /  DBili  x   /  AST  24  /  ALT  31  /  AlkPhos  75  07-17    PT/INR - ( 16 Jul 2020 16:25 )   PT: 11.50 sec;   INR: 1.00 ratio         PTT - ( 16 Jul 2020 16:25 )  PTT:34.9 sec      RADIOLOGY & ADDITIONAL STUDIES:    Assesment:

## 2020-07-17 NOTE — PHYSICAL THERAPY INITIAL EVALUATION ADULT - PERTINENT HX OF CURRENT PROBLEM, REHAB EVAL
61 yo female with pmh of HTN, DM, DLD presents with acute onset of left lower facial weakness and numbness since this morning.

## 2020-07-17 NOTE — CONSULT NOTE ADULT - ASSESSMENT
59 yo female with pmh of HTN, DM, DLD presents with acute onset of left lower facial weakness and numbness since this morning. 2 days prior she noted to have watery discharge from her left eye and currently she feels its different from her right.   IN ED stroke code was called, NIHSS is 2 for facial weakness and numbness. She  has mild upper left facial weakness as well. CTH is negative for acute pathology and CTP showed no penumbra, CTA with multifocal intracranial stenosis.  LWK is 10 am, so she is not a tPA candidate.      Plan:  Admit to stroke unit  Give Aspirin 325 and Statin 80  Keep syst 140-160  MRI brain NC  Echo  Lipid profile, HA1C  Speech and swallow        Neuroattending note will follow
IMPRESSION: Rehab of gait dysfunction / r/o left facial palsy    PRECAUTIONS: [  ] Cardiac  [  ] Respiratory  [  ] Seizures [  ] Contact Isolation  [  ] Droplet Isolation  [  ] Other    Weight Bearing Status:     RECOMMENDATION:    Out of Bed to Chair     DVT/Decubiti Prophylaxis    REHAB PLAN:     [ x  ] Bedside P/T 3-5 times a week   [   ]   Bedside O/T  2-3 times a week             [   ] No Rehab Therapy Indicated                   [  x ]  Speech Therapy   Conditioning/ROM                                    ADL  Bed Mobility                                               Conditioning/ROM  Transfers                                                     Bed Mobility  Sitting /Standing Balance                         Transfers                                        Gait Training                                               Sitting/Standing Balance  Stair Training [   ]Applicable                    Home equipment Eval                                                                        Splinting  [   ] Only      GOALS:   ADL   [   ]   Independent                    Transfers  [ x  ] Independent                          Ambulation  [ x  ] Independent     [  x  ] With device                            [   ]  CG                                                         [   ]  CG                                                                  [   ] CG                            [    ] Min A                                                   [   ] Min A                                                              [   ] Min  A          DISCHARGE PLAN:   [   ]  Good candidate for Intensive Rehabilitation/Hospital based                                             Will tolerate 3hrs Intensive Rehab Daily                                       [    ]  Short Term Rehab in Skilled Nursing Facility                                       [ x   ]  Home with Outpatient or  services - outpatient speech therapy per PMD if symptoms persist                                         [    ]  Possible Candidate for Intensive Hospital based Rehab

## 2020-07-17 NOTE — ED ADULT NURSE REASSESSMENT NOTE - NS ED NURSE REASSESS COMMENT FT1
pt aox4 denies pain / discomfort. neurological assessment documented in flow sheet, no new neurological changes overnight. remains on continuous monitor. VSS. Will will continue to monitor / assess;.

## 2020-07-17 NOTE — SWALLOW BEDSIDE ASSESSMENT ADULT - SLP PERTINENT HISTORY OF CURRENT PROBLEM
Adm w/ acute onset L lower facial weakness & numbness, L eye tearing, unable to close L eye; CT: negative for acute pathology, enlarged pituitary gland "possibly underlying adenoma", CTA: multifocal intracranial stenosis; awaiting results of MRI; hx: HTN, DM, HLD

## 2020-07-17 NOTE — PROGRESS NOTE ADULT - SUBJECTIVE AND OBJECTIVE BOX
SUBJECTIVE:    Patient is a 60y old Female who presents with a chief complaint of weakness/numbness of face (17 Jul 2020 10:09)    Currently admitted to medicine with the primary diagnosis of Numbness and tingling of left side of face     Today is hospital day 1d. This morning she is resting comfortably in bed and reports no new issues or overnight events.     PAST MEDICAL & SURGICAL HISTORY  Diabetes  Hypertension  Hyperlipidemia  No significant past surgical history    SOCIAL HISTORY:  Negative for smoking/alcohol/drug use.     ALLERGIES:  No Known Allergies    MEDICATIONS:  STANDING MEDICATIONS  aspirin  chewable 81 milliGRAM(s) Oral daily  atorvastatin 80 milliGRAM(s) Oral at bedtime  chlorhexidine 4% Liquid 1 Application(s) Topical <User Schedule>  dextrose 5%. 1000 milliLiter(s) IV Continuous <Continuous>  dextrose 50% Injectable 12.5 Gram(s) IV Push once  dextrose 50% Injectable 25 Gram(s) IV Push once  dextrose 50% Injectable 25 Gram(s) IV Push once  gemfibrozil 600 milliGRAM(s) Oral <User Schedule>  insulin glargine Injectable (LANTUS) 14 Unit(s) SubCutaneous at bedtime  insulin lispro (HumaLOG) corrective regimen sliding scale   SubCutaneous three times a day before meals  insulin lispro Injectable (HumaLOG) 5 Unit(s) SubCutaneous three times a day before meals  pantoprazole    Tablet 40 milliGRAM(s) Oral before breakfast    PRN MEDICATIONS  dextrose 40% Gel 15 Gram(s) Oral once PRN  glucagon  Injectable 1 milliGRAM(s) IntraMuscular once PRN  LORazepam   Injectable 2 milliGRAM(s) IV Push once PRN    VITALS:   T(F): 98.4  HR: 80  BP: 135/61  RR: 18  SpO2: 98%    LABS:                        13.1   11.81 )-----------( 165      ( 17 Jul 2020 05:34 )             38.3     07-17    138  |  98  |  16  ----------------------------<  143<H>  5.1<H>   |  29  |  0.9    Ca    9.8      17 Jul 2020 05:34  Mg     2.1     07-17    TPro  6.9  /  Alb  4.5  /  TBili  0.6  /  DBili  x   /  AST  24  /  ALT  31  /  AlkPhos  75  07-17    PT/INR - ( 16 Jul 2020 16:25 )   PT: 11.50 sec;   INR: 1.00 ratio         PTT - ( 16 Jul 2020 16:25 )  PTT:34.9 sec      Troponin T, Serum: <0.01 ng/mL (07-16-20 @ 16:25)      CARDIAC MARKERS ( 16 Jul 2020 16:25 )  x     / <0.01 ng/mL / x     / x     / x          RADIOLOGY:  < from: MR Head No Cont (07.17.20 @ 08:39) >  IMPRESSION:     1.  No evidence of recent infarct or acute intracranial hemorrhage.    2.  Minimal chronic microvascular change.    3.  9 mm ovoid lesion along the anterior/superior aspect of the pituitary gland. Signal suggests that this reflects a proteinaceous or hemorrhagic cyst or cystic lesion. Recommend follow-up with a contrast-enhanced pituitary protocol MRI.    < end of copied text >  < from: CT Perfusion w/ Maps w/ IV Cont (07.16.20 @ 16:45) >  1.  Severe stenosis or occlusion of the A1 segment of the left TERENCE. There is opacification of the distal A1 segment and the A2 segment via the anterior communicating artery. No corresponding perfusionabnormality is noted, and this may be a chronic finding.    2.  Otherwise no evidence of major vascular stenosis or occlusion.    3.  Scattered atheromatous changes including mild (<50%) stenosis of the left ICA origin, moderate stenosis of the left vertebral artery proximal V4 segment, and calcific plaque of the carotid siphons with moderate left-sided stenoses.    4.  2 cm left thyroid nodule, recommend further evaluation with thyroid sonogram on an outpatient/elective basis    < end of copied text >    < from: CT Brain Stroke Protocol (07.16.20 @ 16:36) >    IMPRESSION:  No acute intracranial pathology. No evidence of midline shift, mass effect or intracranial hemorrhage.    Enlarged pituitary gland which measures approximately 1.0 cm in craniocaudal extent possibly representing an underlying adenoma. Follow-up dynamic MRI of the sella can be obtained for further evaluation.    These findings were discussed with ANALI Olvera of stroke neurology at 7/16/2020 4:38 PM by Dr. Zaragoza with read back confirmation.    < end of copied text >  PHYSICAL EXAM:  CONSTITUTIONAL: NAD  ENMT: EOMI, PERRLA, left eye watery discharge, No tonsillar erythema, exudates, or enlargement, neck supple, No JVD  PSYCH: Alert & Oriented X3  RESPIRATORY: Clear to percussion bilaterally; No rales, rhonchi, wheezing, or rubs  CARDIOVASCULAR: Regular rate and rhythm; No murmurs, rubs, or gallops, negative edema  GASTROINTESTINAL: Soft, Nontender, Nondistended; Bowel sounds present  EXTREMITIES:  2+ Peripheral Pulses, No clubbing, cyanosis  SKIN: No rashes or lesions  NEURO: Decreased strength in left eye closing CN VII involvement, minimal left lip droopingm, sensation of bilateral face intact SUBJECTIVE:    Patient is a 60y old Female who presents with a chief complaint of weakness/numbness of face (17 Jul 2020 10:09)    Currently admitted to medicine with the primary diagnosis of Numbness and tingling of left side of face     Today is hospital day 1d. This morning she is resting comfortably in bed and reports no new issues or overnight events.     Attending: Pt seen and examined at bedside. No cp or sob. No overnight events.     PAST MEDICAL & SURGICAL HISTORY  Diabetes  Hypertension  Hyperlipidemia  No significant past surgical history    SOCIAL HISTORY:  Negative for smoking/alcohol/drug use.     ALLERGIES:  No Known Allergies    MEDICATIONS:  STANDING MEDICATIONS  aspirin  chewable 81 milliGRAM(s) Oral daily  atorvastatin 80 milliGRAM(s) Oral at bedtime  chlorhexidine 4% Liquid 1 Application(s) Topical <User Schedule>  dextrose 5%. 1000 milliLiter(s) IV Continuous <Continuous>  dextrose 50% Injectable 12.5 Gram(s) IV Push once  dextrose 50% Injectable 25 Gram(s) IV Push once  dextrose 50% Injectable 25 Gram(s) IV Push once  gemfibrozil 600 milliGRAM(s) Oral <User Schedule>  insulin glargine Injectable (LANTUS) 14 Unit(s) SubCutaneous at bedtime  insulin lispro (HumaLOG) corrective regimen sliding scale   SubCutaneous three times a day before meals  insulin lispro Injectable (HumaLOG) 5 Unit(s) SubCutaneous three times a day before meals  pantoprazole    Tablet 40 milliGRAM(s) Oral before breakfast    PRN MEDICATIONS  dextrose 40% Gel 15 Gram(s) Oral once PRN  glucagon  Injectable 1 milliGRAM(s) IntraMuscular once PRN  LORazepam   Injectable 2 milliGRAM(s) IV Push once PRN    VITALS:   T(F): 98.4  HR: 80  BP: 135/61  RR: 18  SpO2: 98%    LABS:                        13.1   11.81 )-----------( 165      ( 17 Jul 2020 05:34 )             38.3     07-17    138  |  98  |  16  ----------------------------<  143<H>  5.1<H>   |  29  |  0.9    Ca    9.8      17 Jul 2020 05:34  Mg     2.1     07-17    TPro  6.9  /  Alb  4.5  /  TBili  0.6  /  DBili  x   /  AST  24  /  ALT  31  /  AlkPhos  75  07-17    PT/INR - ( 16 Jul 2020 16:25 )   PT: 11.50 sec;   INR: 1.00 ratio         PTT - ( 16 Jul 2020 16:25 )  PTT:34.9 sec      Troponin T, Serum: <0.01 ng/mL (07-16-20 @ 16:25)      CARDIAC MARKERS ( 16 Jul 2020 16:25 )  x     / <0.01 ng/mL / x     / x     / x          RADIOLOGY:  < from: MR Head No Cont (07.17.20 @ 08:39) >  IMPRESSION:     1.  No evidence of recent infarct or acute intracranial hemorrhage.    2.  Minimal chronic microvascular change.    3.  9 mm ovoid lesion along the anterior/superior aspect of the pituitary gland. Signal suggests that this reflects a proteinaceous or hemorrhagic cyst or cystic lesion. Recommend follow-up with a contrast-enhanced pituitary protocol MRI.    < end of copied text >  < from: CT Perfusion w/ Maps w/ IV Cont (07.16.20 @ 16:45) >  1.  Severe stenosis or occlusion of the A1 segment of the left TERENCE. There is opacification of the distal A1 segment and the A2 segment via the anterior communicating artery. No corresponding perfusionabnormality is noted, and this may be a chronic finding.    2.  Otherwise no evidence of major vascular stenosis or occlusion.    3.  Scattered atheromatous changes including mild (<50%) stenosis of the left ICA origin, moderate stenosis of the left vertebral artery proximal V4 segment, and calcific plaque of the carotid siphons with moderate left-sided stenoses.    4.  2 cm left thyroid nodule, recommend further evaluation with thyroid sonogram on an outpatient/elective basis    < end of copied text >    < from: CT Brain Stroke Protocol (07.16.20 @ 16:36) >    IMPRESSION:  No acute intracranial pathology. No evidence of midline shift, mass effect or intracranial hemorrhage.    Enlarged pituitary gland which measures approximately 1.0 cm in craniocaudal extent possibly representing an underlying adenoma. Follow-up dynamic MRI of the sella can be obtained for further evaluation.    These findings were discussed with ANALI Olvera of stroke neurology at 7/16/2020 4:38 PM by Dr. Zaragoza with read back confirmation.    < end of copied text >  PHYSICAL EXAM:  CONSTITUTIONAL: NAD  ENMT: EOMI, PERRLA, left eye watery discharge, No tonsillar erythema, exudates, or enlargement, neck supple, No JVD  PSYCH: Alert & Oriented X3  RESPIRATORY: Clear to percussion bilaterally; No rales, rhonchi, wheezing, or rubs  CARDIOVASCULAR: Regular rate and rhythm; No murmurs, rubs, or gallops, negative edema  GASTROINTESTINAL: Soft, Nontender, Nondistended; Bowel sounds present  EXTREMITIES:  2+ Peripheral Pulses, No clubbing, cyanosis  SKIN: No rashes or lesions  NEURO: Decreased strength in left eye closing CN VII involvement, minimal left lip droopingm, sensation of bilateral face intact

## 2020-07-17 NOTE — PHYSICAL THERAPY INITIAL EVALUATION ADULT - GENERAL OBSERVATIONS, REHAB EVAL
pt encountered in ED - agreeable to PT IE - + telemetry - pt unable to smile on left side - pt ROM/strength/sensation all WFL - pt is ind in functional mobility - not skilled PT needs at this time - DC PT

## 2020-07-17 NOTE — SWALLOW BEDSIDE ASSESSMENT ADULT - SLP GENERAL OBSERVATIONS
awake, alert, Ox4, upright in bed, fluent, denies changes in speech/language/cognition, reports continued L facial numbness

## 2020-07-17 NOTE — PROGRESS NOTE ADULT - ASSESSMENT
A 61 yo woman with pmh of HTN, DM, DLD presents with acute onset of left lower facial weakness and numbness, NIHSS 2, Brain MRI negative for acute infarction. She is found to have incidental, asymptomatic severe stenosis of left TERENCE, scattered atheromatous mild stenosis of left ICA origin, moderate stenosis of left VA. B/l carotid duplex scan < 50% stenosis b/l ICA. She is also with incidental 9 mm ovoid lesion along the anterior/superior aspect of the pituitary gland suggestive of a proteinaceous or hemorrhagic cyst or cystic lesion. Clinically, she continues to have both UMN/LMN weakness and more suggestive of Bell's Palsy than ischemic infarction/TIA.     SUGGESTIONS:  - From neurovascular perspective may be discharged home  - Due to multi scattered atheromatous intracranial stenosis she may benefit from ASA 81 mg daily and high dose statin therapy  - May consider outpatient endocrinology and neurosurgery evaluation for Brain MRI findings  - Consider for Bell's Palsy valacyclovir 1000 mg TID x 1 week and Prednisone 60 mg QD x 1 week. However, will need to closely monitor her glucose level   - F/u PMD  - Outpatient follow up with General Neurology clinic in 2-3 weeks     Case discussed and patient seen with attending physician   Lucretia Kendrick NP  [phone]

## 2020-07-17 NOTE — PROGRESS NOTE ADULT - SUBJECTIVE AND OBJECTIVE BOX
Stroke Progress Note:      1. Chief Complaint: left facial palsy and numbness    HPI: This is a 60 years old woman with pmhx of HTN, DM, DLD presents with acute onset of left lower facial weakness and numbness since this morning. 2 days prior she noted to have watery discharge from her left eye and currently she feels its different from her right. IN ED stroke code was called, NIHSS is 2 for facial weakness and numbness. She also has mild upper left facial weakness as well. CTH is negative for acute pathology and CTP showed no penumbra, CTA with multifocal intracranial stenosis, see below.   LWK is 10 am, so she is not a tPA candidate.    Interval History: Pt seen in ED, Brain MRI completed and no acute infarction. She continues to report of left face weakness.     2. Relevant PMH:   Prior ischemic stroke/TIA[ ], Afib [ ], CAD [ ], HTN [x ], DLD [x ], DM [ x], PVD [ ], Obesity [ ],   Sedentary lifestyle [ ], CHF [ ], ADA [ ], Cancer Hx [ ].    3. Social History: Smoking [ ], Drug Use [ ], Alcohol Use [ ], Other [ ]    4. Possible Location of Stroke: N/A    5. Relevant Brain Tissue Imaging:  < from: MR Head No Cont (07.17.20 @ 08:39) >  IMPRESSION:   1.  No evidence of recent infarct or acute intracranial hemorrhage.  2.  Minimal chronic microvascular change.  3.  9 mm ovoid lesion along the anterior/superior aspect of the pituitary gland. Signal suggests that this reflects a proteinaceous or hemorrhagic cyst or cystic lesion. Recommend follow-up with a contrast-enhanced pituitary protocol MRI.    6. Relevant Cerebrovascular Imaging:   < from: CT Perfusion w/ Maps w/ IV Cont (07.16.20 @ 16:45) >    Findings:    CTA neck:   The visualized aortic arch and great vessel origins are patent.    The right common, internal and external carotid arteries are patent.  The left common, internal and external carotid arteries are patent. There is calcific plaque along the posterior wall of the left carotid bulb with mild (less than 50%) stenosis.  The vertebral arteries are patent, with mild stenoses of the left vertebral artery throughout its cervical course.    CTA brain: There is calcific plaque of the carotid siphons with moderate stenoses of the left precavernous, cavernous and supraclinoid segments, and mild stenosis of the right supraclinoid segment.  The right TERENCE and MCA are patent.  The left MCA is patent. There is severe stenosis or occlusion of the proximal A1 segment of the left TERENCE.  The distal vertebral arteries demonstrate atheromatous changes with mild stenosis of the right proximal V4 segment and moderate stenosis of the left proximal V4 segment. The basilar artery is patent. The posterior cerebral arteries are patent.  Perfusion: Apparent perfusion abnormality in the right posterior fossa with Tmax >6s volume of 7 cc is likely artifactual. There is no evidence of diminished cerebral blood flow.    Other:   Moderate-severe cervical spine degenerative changes  2 cm left thyroid lobe nodule.  Partially visualized 3.4 cm ovoid subcutaneous lesion within the back to the right of midline, recommend correlation with exam.  Redemonstrated mild pituitary prominence.    IMPRESSION:  1.  Severe stenosis or occlusion of the A1 segment of the left TERENCE. There is opacification of the distal A1 segment and the A2 segment via the anterior communicating artery. No corresponding perfusionabnormality is noted, and this may be a chronic finding.  2.  Otherwise no evidence of major vascular stenosis or occlusion.  3.  Scattered atheromatous changes including mild (<50%) stenosis of the left ICA origin, moderate stenosis of the left vertebral artery proximal V4 segment, and calcific plaque of the carotid siphons with moderate left-sided stenoses.  4.  2 cm left thyroid nodule, recommend further evaluation with thyroid sonogram on an outpatient/elective basis.    7. Relevant blood tests:   Lipid Profile (07.17.20 @ 05:34)    Total Cholesterol/HDL Ratio Measurement: 6.0 Ratio    Cholesterol, Serum: 180 mg/dL    Triglycerides, Serum: 540 mg/dL    HDL Cholesterol, Serum: 30: HDL Levels >/= 60 mg/dL are considered beneficial and a "negative" risk  factor.  Effective 08/15/2018: New reference range and interpretive comment. mg/dL    Direct LDL: 90: LDL Cholesterol (mg/dL) --- Interpretive Comment (for adults 18 and over)    A1C with Estimated Average Glucose (07.17.20 @ 05:34)    A1C with Estimated Average Glucose Result: 8.1: Method: Immunoassay                        13.1   11.81 )-----------( 165      ( 17 Jul 2020 05:34 )             38.3       07-17    138  |  98  |  16  ----------------------------<  143<H>  5.1<H>   |  29  |  0.9    Ca    9.8      17 Jul 2020 05:34  Mg     2.1     07-17    TPro  6.9  /  Alb  4.5  /  TBili  0.6  /  DBili  x   /  AST  24  /  ALT  31  /  AlkPhos  75  07-17    PT/INR - ( 16 Jul 2020 16:25 )   PT: 11.50 sec;   INR: 1.00 ratio    PTT - ( 16 Jul 2020 16:25 )  PTT:34.9 sec    8. Relevant cardiac rhythm monitoring:  < from: 12 Lead ECG (07.16.20 @ 17:16) >  Ventricular Rate 90 BPM  Atrial Rate 90 BPM  P-R Interval 146 ms  QRS Duration 72 ms  Q-T Interval 352 ms  QTC Calculation(Bezet) 430 ms  P Axis 68 degrees  R Axis 72 degrees  T Axis 62 degrees  Diagnosis Line Normal sinus rhythm  Normal ECG    9. Relevant Cardiac Structure: (TTE/ANTONIO +/-):[ ]No intracardiac thrombus/[ ] no vegetation/[ ]no [place]ia/EF:  < from: Transthoracic Echocardiogram (07.17.20 @ 15:10) >  Summary:   1. Normal global left ventricular systolic function.   2. LV Ejection Fraction by Sol's Method with a biplane EF of 63 %.   3. Color flow doppler and intravenous injection of agitated saline demonstrates the presence of an intact intra atrial septum.    PHYSICIAN INTERPRETATION:  Left Ventricle: The left ventricular internal cavity size is normal. Leftventricular wall thickness is normal. Global LV systolic function was normal. Spectral Doppler shows normal pattern of LV diastolic filling.  Right Ventricle: Normal right ventricular size and function.  Left Atrium: Normal left atrial size. Color flow doppler and intravenous injection of agitated saline demonstrates the presence of an intact intra atrial septum.  Right Atrium: Normal right atrial size.  Pericardium: There is no evidence of pericardial effusion.  Mitral Valve: The mitral valve isnormal in structure. Trace mitral valve regurgitation is seen.  Tricuspid Valve: The tricuspid valve is normal in structure. Trivial tricuspid regurgitation is visualized.  Aortic Valve: Normal trileaflet aortic valve with normal opening. No evidenceof aortic valve regurgitation is seen.  Pulmonic Valve: The pulmonic valve is normal. No indication of pulmonic valve regurgitation.  Aorta: The aortic root is normal in size and structure.  Pulmonary Artery: The pulmonary artery is of normal size and origin.  Shunts: Agitated saline contrast was given intravenously to evaluate for intracardiac shunting.    MEDICATIONS  (STANDING):  aspirin  chewable 81 milliGRAM(s) Oral daily  atorvastatin 80 milliGRAM(s) Oral at bedtime  chlorhexidine 4% Liquid 1 Application(s) Topical <User Schedule>  dextrose 5%. 1000 milliLiter(s) (50 mL/Hr) IV Continuous <Continuous>  dextrose 50% Injectable 12.5 Gram(s) IV Push once  dextrose 50% Injectable 25 Gram(s) IV Push once  dextrose 50% Injectable 25 Gram(s) IV Push once  gemfibrozil 600 milliGRAM(s) Oral <User Schedule>  heparin   Injectable 5000 Unit(s) SubCutaneous every 12 hours  insulin glargine Injectable (LANTUS) 14 Unit(s) SubCutaneous at bedtime  insulin lispro (HumaLOG) corrective regimen sliding scale   SubCutaneous three times a day before meals  insulin lispro Injectable (HumaLOG) 5 Unit(s) SubCutaneous three times a day before meals  pantoprazole    Tablet 40 milliGRAM(s) Oral before breakfast    10. PT/OT/Speech/Rehab/S&Sw/ Cognitive eval results and recommendations: N/A    11. Exam:    Vital Signs Last 24 Hrs  T(C): 37.8 (16 Jul 2020 16:07), Max: 37.8 (16 Jul 2020 16:07)  T(F): 100 (16 Jul 2020 16:07), Max: 100 (16 Jul 2020 16:07)  HR: 102 (16 Jul 2020 16:45) (102 - 102)  BP: 159/72 (16 Jul 2020 16:45) (159/72 - 186/86)  BP(mean): --  RR: 18 (16 Jul 2020 16:45) (18 - 18)  SpO2: 99% (16 Jul 2020 16:45) (97% - 99%)    12. Neurologic Exam:  Mental status: Awake, alert and oriented x 4. Attention and concentration intact. Fund of knowledge appropriate.  Language: Follows all commands. Naming, repetition, fluency, and comprehension intact. No dysarthria, no aphasia.  Cranial nerves: B/l pupils equally round and reactive to light, left eye weakness and cannot close tightly, eyebrow asymmetrical, visual fields full, no nystagmus, extraocular muscles intact, V1 through V3 intact bilaterally and symmetric, left facial asymmetry, hearing intact to finger rub, palate elevation symmetric, tongue was midline  Motor: No drifting in all extremities. Normal bulk and tone, strength 5/5 in bilateral upper and lower extremities.   strength 5/5.   Sensation: Intact to light touch, proprioception, and pinprick.  No neglect.   Coordination: No dysmetria on finger-to-nose and heel-to-shin.   Reflexes: 2+ in upper and lower extremities, downgoing toes bilaterally  Gait: Narrow and steady. No ataxia.     NIH STROKE SCALE  Item	                                                        Score  1 a.	Level of Consciousness	               	0  1 b. LOC Questions	                                    0  1 c.	LOC Commands	                               	0  2.	Best Gaze	                                    0  3.	Visual	                                                0  4.	Facial Palsy	                                    1  5 a.	Motor Arm - Left	                        0  5 b.	Motor Arm - Right	                        0  6 a.	Motor Leg - Left	                        0  6 b.	Motor Leg - Right	                        0  7.	Limb Ataxia	                                    0  8.	Sensory	                                                1  9.	Language	                                    0  10.	Dysarthria	                                    0  11.	Extinction and Inattention  	            0  __________________________________________________________________  TOTAL	                                                        2    Total NIHSS on admission:   2   NIHSS yesterday:  2    NIHSS today: 2    mRS: 1 No significant disability despite symptoms; able to carry out all usual duties and activities without assistance

## 2020-07-17 NOTE — PROGRESS NOTE ADULT - ASSESSMENT
61 yo female with pmh of HTN, DM, DLD presents with acute onset of left lower facial weakness and numbness since this morning. Stroke code was called, NIHSS was 2 for facial weakness and numbness. CTH is negative for acute pathology and CTP showed no penumbra, CTA with multifocal intracranial stenosis. LWK is 10 am, so she was not a tPA candidate.     # L sided facial weakness, R/o stroke vs [place] Palsy  - CTH negative for acute infarct, CTA Head and neck shows no major stenosis in major vessels but shows Severe stenosis or occlusion of the A1 segment of the left TERENCE.. MRI head negative for infarct or hemorrhage  - s/p 325 asa, 80 statin in ed  - continue with aspirin, statin  - admit to stroke unit  - q1 neuro checks  - keep syst 140-160  - TTE with bubble study  - lipid profile, hba1c  - diet resumed as per speech and swallow  - neurosx consult for severe stenosis or occlusion of the A1 segment of the left TERENCE  - f/u neurology     # DM2  - lantus 14qhs, lispro 5 qac  - f/u fs  - avoid hypoglycemia  - check a1c     # HTN  - lisinopril 10mg qd *on hold  - keep syst 140-160    # DLD  - lipitor 80mg q24h  - continue gemfibrozil    # 2cm left thyroid nodule  - f/u as outpatient, will need thyroid u/s    # 9mm ovoid pituitary lesion  - o/p follow up    # DVT PPx- Heparin  # GI PPx- Protonix 40mg qd  # Diet- DASH/TLC  #Activity- Increase as tolerated  #Dispo- Stroke unit  #Code- FULL 59 yo female with pmh of HTN, DM, DLD presents with acute onset of left lower facial weakness and numbness since this morning. Stroke code was called, NIHSS was 2 for facial weakness and numbness. CTH is negative for acute pathology and CTP showed no penumbra, CTA with multifocal intracranial stenosis. LWK is 10 am, so she was not a tPA candidate.     # L sided facial weakness, R/o stroke vs [place] Palsy  - CTH negative for acute infarct, CTA Head and neck shows no major stenosis in major vessels but shows Severe stenosis or occlusion of the A1 segment of the left TERENCE.. MRI head negative for infarct or hemorrhage  - s/p 325 asa, 80 statin in ed  - continue with aspirin, statin  - admit to stroke unit  - q1 neuro checks  - keep syst 140-160  - TTE with bubble study  - lipid profile, hba1c  - diet resumed as per speech and swallow  - neurosx consult for severe stenosis or occlusion of the A1 segment of the left TERENCE  - f/u neurology     #Hypertriglyceridemia- started on gemfibrozil     # DM2  - lantus 14qhs, lispro 5 qac  - f/u fs  - avoid hypoglycemia  - check a1c     # HTN  - lisinopril 10mg qd *on hold  - keep syst 140-160    # DLD  - lipitor 80mg q24h  - continue gemfibrozil    # 2cm left thyroid nodule  - f/u as outpatient, will need thyroid u/s    # 9mm ovoid pituitary lesion  - o/p follow up    # DVT PPx- Heparin  # GI PPx- Protonix 40mg qd  # Diet- DASH/TLC  #Activity- Increase as tolerated  #Dispo- Stroke unit  #Code- FULL

## 2020-07-18 ENCOUNTER — TRANSCRIPTION ENCOUNTER (OUTPATIENT)
Age: 60
End: 2020-07-18

## 2020-07-18 VITALS
HEART RATE: 79 BPM | RESPIRATION RATE: 18 BRPM | TEMPERATURE: 98 F | SYSTOLIC BLOOD PRESSURE: 142 MMHG | DIASTOLIC BLOOD PRESSURE: 67 MMHG

## 2020-07-18 LAB
ALBUMIN SERPL ELPH-MCNC: 4.4 G/DL — SIGNIFICANT CHANGE UP (ref 3.5–5.2)
ALP SERPL-CCNC: 74 U/L — SIGNIFICANT CHANGE UP (ref 30–115)
ALT FLD-CCNC: 38 U/L — SIGNIFICANT CHANGE UP (ref 0–41)
ANION GAP SERPL CALC-SCNC: 12 MMOL/L — SIGNIFICANT CHANGE UP (ref 7–14)
AST SERPL-CCNC: 39 U/L — SIGNIFICANT CHANGE UP (ref 0–41)
BASOPHILS # BLD AUTO: 0.03 K/UL — SIGNIFICANT CHANGE UP (ref 0–0.2)
BASOPHILS NFR BLD AUTO: 0.3 % — SIGNIFICANT CHANGE UP (ref 0–1)
BILIRUB SERPL-MCNC: 0.5 MG/DL — SIGNIFICANT CHANGE UP (ref 0.2–1.2)
BUN SERPL-MCNC: 18 MG/DL — SIGNIFICANT CHANGE UP (ref 10–20)
CALCIUM SERPL-MCNC: 9.5 MG/DL — SIGNIFICANT CHANGE UP (ref 8.5–10.1)
CHLORIDE SERPL-SCNC: 96 MMOL/L — LOW (ref 98–110)
CO2 SERPL-SCNC: 27 MMOL/L — SIGNIFICANT CHANGE UP (ref 17–32)
CREAT SERPL-MCNC: 0.9 MG/DL — SIGNIFICANT CHANGE UP (ref 0.7–1.5)
EOSINOPHIL # BLD AUTO: 0.1 K/UL — SIGNIFICANT CHANGE UP (ref 0–0.7)
EOSINOPHIL NFR BLD AUTO: 0.9 % — SIGNIFICANT CHANGE UP (ref 0–8)
GLUCOSE BLDC GLUCOMTR-MCNC: 185 MG/DL — HIGH (ref 70–99)
GLUCOSE BLDC GLUCOMTR-MCNC: 304 MG/DL — HIGH (ref 70–99)
GLUCOSE BLDC GLUCOMTR-MCNC: 310 MG/DL — HIGH (ref 70–99)
GLUCOSE SERPL-MCNC: 223 MG/DL — HIGH (ref 70–99)
HCT VFR BLD CALC: 38.1 % — SIGNIFICANT CHANGE UP (ref 37–47)
HGB BLD-MCNC: 13 G/DL — SIGNIFICANT CHANGE UP (ref 12–16)
IMM GRANULOCYTES NFR BLD AUTO: 0.8 % — HIGH (ref 0.1–0.3)
LYMPHOCYTES # BLD AUTO: 2.53 K/UL — SIGNIFICANT CHANGE UP (ref 1.2–3.4)
LYMPHOCYTES # BLD AUTO: 23.7 % — SIGNIFICANT CHANGE UP (ref 20.5–51.1)
MCHC RBC-ENTMCNC: 31.3 PG — HIGH (ref 27–31)
MCHC RBC-ENTMCNC: 34.1 G/DL — SIGNIFICANT CHANGE UP (ref 32–37)
MCV RBC AUTO: 91.6 FL — SIGNIFICANT CHANGE UP (ref 81–99)
MONOCYTES # BLD AUTO: 0.66 K/UL — HIGH (ref 0.1–0.6)
MONOCYTES NFR BLD AUTO: 6.2 % — SIGNIFICANT CHANGE UP (ref 1.7–9.3)
NEUTROPHILS # BLD AUTO: 7.25 K/UL — HIGH (ref 1.4–6.5)
NEUTROPHILS NFR BLD AUTO: 68.1 % — SIGNIFICANT CHANGE UP (ref 42.2–75.2)
NRBC # BLD: 0 /100 WBCS — SIGNIFICANT CHANGE UP (ref 0–0)
PLATELET # BLD AUTO: 160 K/UL — SIGNIFICANT CHANGE UP (ref 130–400)
POTASSIUM SERPL-MCNC: 4.7 MMOL/L — SIGNIFICANT CHANGE UP (ref 3.5–5)
POTASSIUM SERPL-SCNC: 4.7 MMOL/L — SIGNIFICANT CHANGE UP (ref 3.5–5)
PROT SERPL-MCNC: 6.8 G/DL — SIGNIFICANT CHANGE UP (ref 6–8)
RBC # BLD: 4.16 M/UL — LOW (ref 4.2–5.4)
RBC # FLD: 13.2 % — SIGNIFICANT CHANGE UP (ref 11.5–14.5)
SODIUM SERPL-SCNC: 135 MMOL/L — SIGNIFICANT CHANGE UP (ref 135–146)
WBC # BLD: 10.66 K/UL — SIGNIFICANT CHANGE UP (ref 4.8–10.8)
WBC # FLD AUTO: 10.66 K/UL — SIGNIFICANT CHANGE UP (ref 4.8–10.8)

## 2020-07-18 PROCEDURE — 99232 SBSQ HOSP IP/OBS MODERATE 35: CPT

## 2020-07-18 PROCEDURE — 99239 HOSP IP/OBS DSCHRG MGMT >30: CPT

## 2020-07-18 RX ORDER — ATORVASTATIN CALCIUM 80 MG/1
1 TABLET, FILM COATED ORAL
Qty: 30 | Refills: 1
Start: 2020-07-18 | End: 2020-09-15

## 2020-07-18 RX ORDER — VALACYCLOVIR 500 MG/1
1 TABLET, FILM COATED ORAL
Qty: 18 | Refills: 0
Start: 2020-07-18 | End: 2020-07-23

## 2020-07-18 RX ORDER — PANTOPRAZOLE SODIUM 20 MG/1
1 TABLET, DELAYED RELEASE ORAL
Qty: 7 | Refills: 0
Start: 2020-07-18 | End: 2020-07-24

## 2020-07-18 RX ORDER — VALACYCLOVIR 500 MG/1
1000 TABLET, FILM COATED ORAL ONCE
Refills: 0 | Status: COMPLETED | OUTPATIENT
Start: 2020-07-18 | End: 2020-07-18

## 2020-07-18 RX ORDER — VALACYCLOVIR 500 MG/1
1000 TABLET, FILM COATED ORAL THREE TIMES A DAY
Refills: 0 | Status: DISCONTINUED | OUTPATIENT
Start: 2020-07-18 | End: 2020-07-18

## 2020-07-18 RX ORDER — ROSUVASTATIN CALCIUM 5 MG/1
1 TABLET ORAL
Qty: 0 | Refills: 0 | DISCHARGE

## 2020-07-18 RX ORDER — GEMFIBROZIL 600 MG
1 TABLET ORAL
Qty: 30 | Refills: 1
Start: 2020-07-18 | End: 2020-09-15

## 2020-07-18 RX ADMIN — Medication 40 MILLIGRAM(S): at 18:28

## 2020-07-18 RX ADMIN — Medication 5 UNIT(S): at 11:27

## 2020-07-18 RX ADMIN — Medication 4: at 08:05

## 2020-07-18 RX ADMIN — Medication 5 UNIT(S): at 17:21

## 2020-07-18 RX ADMIN — HEPARIN SODIUM 5000 UNIT(S): 5000 INJECTION INTRAVENOUS; SUBCUTANEOUS at 05:34

## 2020-07-18 RX ADMIN — Medication 1: at 17:21

## 2020-07-18 RX ADMIN — Medication 20 MILLIGRAM(S): at 14:33

## 2020-07-18 RX ADMIN — Medication 600 MILLIGRAM(S): at 08:05

## 2020-07-18 RX ADMIN — Medication 4: at 11:27

## 2020-07-18 RX ADMIN — HEPARIN SODIUM 5000 UNIT(S): 5000 INJECTION INTRAVENOUS; SUBCUTANEOUS at 17:22

## 2020-07-18 RX ADMIN — Medication 5 UNIT(S): at 08:05

## 2020-07-18 RX ADMIN — VALACYCLOVIR 1000 MILLIGRAM(S): 500 TABLET, FILM COATED ORAL at 14:34

## 2020-07-18 RX ADMIN — Medication 81 MILLIGRAM(S): at 11:28

## 2020-07-18 NOTE — DISCHARGE NOTE PROVIDER - CARE PROVIDER_API CALL
Elio Vann  NEUROLOGY  [address] [place] [address]  Phone: [phone]  Fax: [phone]  Follow Up Time: 1 month    Nicole Pope  NEUROSURGERY  [address] [place] [address]  Phone: [phone]  Fax: [phone]  Follow Up Time: 2 weeks

## 2020-07-18 NOTE — DISCHARGE NOTE PROVIDER - HOSPITAL COURSE
HPI:    61 yo female with pmh of HTN, DM, DLD presents with acute onset of left lower facial weakness and numbness since this morning. She said yesterday her left eye began tearing, was unable to close it. This morning, around 10AM, she began to feel like the left side of her face was numb and her lip was drooping. She denies any chest pain, palpitations, dizziness, headaches, incontinence, abdominal pain, fevers, diarrhea, nausea, vomiting, vision or hearing changes. She has never had symptoms like this before.    ED Course:     T 100F, P 102, /86, R 18, S 97% on RA at rest. Stroke code was called, NIHSS was 2 for facial weakness and numbness. CTH is negative for acute pathology and CTP showed no penumbra, CTA with multifocal intracranial stenosis.    LWK is 10 am, so she was not a tPA candidate. Loaded with aspirin and statin in ED. (16 Jul 2020 22:01)        61 yo female with pmh of HTN, DM, DLD presents with acute onset of left lower facial weakness and numbness since this morning. Stroke code was called, NIHSS was 2 for facial weakness and numbness. CTH is negative for acute pathology and CTP showed no penumbra, CTA with multifocal intracranial stenosis. LWK is 10 am, so she was not a tPA candidate.         # L sided facial weakness, R/o stroke vs [place] Palsy    - CTH negative for acute infarct, CTA Head and neck shows no major stenosis in major vessels but shows Severe stenosis or occlusion of the A1 segment of the left TERENCE.. MRI head negative for infarct or hemorrhage    - s/p 325 asa, 80 statin in ed    - continue with aspirin, statin    - admit to stroke unit    - q1 neuro checks    - keep syst 140-160    - TTE with bubble study    - lipid profile, hba1c    - diet resumed as per speech and swallow    - neurosx consult for severe stenosis or occlusion of the A1 segment of the left TERENCE    - f/u neurology     MRI neg for infarct, Neurointervention no intervention, follow up outpt neuro clinic 3 weeks, will treat for bells palsey            #Hypertriglyceridemia- started on gemfibrozil         # DM2    - lantus 14qhs, lispro 5 qac    - f/u fs    - avoid hypoglycemia    - check a1c         # HTN    - lisinopril 10mg qd *on hold    - keep syst 140-160        # DLD    - lipitor 80mg q24h    - continue gemfibrozil        # 2cm left thyroid nodule    - f/u as outpatient, will need thyroid u/s        # 9mm ovoid pituitary lesion    - o/p follow up with neuro surgery dr. salguero

## 2020-07-18 NOTE — PROGRESS NOTE ADULT - SUBJECTIVE AND OBJECTIVE BOX
Stroke Progress Note:    ASHLEY MIRANDA    1. Chief Complaint: L facial weakness    HPI:  61 yo female with pmh of HTN, DM, DLD presents with acute onset of left lower facial weakness and numbness since this morning. She said yesterday her left eye began tearing, was unable to close it. This morning, around 10AM, she began to feel like the left side of her face was numb and her lip was drooping. She denies any chest pain, palpitations, dizziness, headaches, incontinence, abdominal pain, fevers, diarrhea, nausea, vomiting, vision or hearing changes. She has never had symptoms like this before.  ED Course: NIHSS was 2 for facial weakness and numbness. CTH is negative for acute pathology and CTP showed no penumbra, CTA with multifocal intracranial stenosis. She was not a tPA candidate. Loaded with aspirin and statin in ED.       2. Relevant PMH:   Prior ischemic stroke/TIA[ ], Afib [ ], CAD [ ], HTN [ x], DLD [ x], DM [x ], PVD [ ], Obesity [ ],   Sedentary lifestyle [ ], CHF [ ], ADA [ ], Cancer Hx [ ].    3. Social History: Smoking [ ], Drug Use [ ], Alcohol Use [ ], Other [ ]    4. Possible Location of Stroke:    5. Relevant Brain Tissue Imaging:  < from: MR Head No Cont (07.17.20 @ 08:39) >    IMPRESSION:     1.  No evidence of recent infarct or acute intracranial hemorrhage.    2.  Minimal chronic microvascular change.    3.  9 mm ovoid lesion along the anterior/superior aspect of the pituitary gland. Signal suggests that this reflects a proteinaceous or hemorrhagic cyst or cystic lesion. Recommend follow-up with a contrast-enhanced pituitary protocol MRI.      6. Relevant Cerebrovascular Imaging:     CT Perfusion w/ Maps w/ IV Cont:   EXAM:  CT PERFUSION W MAPS IC        IMPRESSION:    1.  Severe stenosis or occlusion of the A1 segment of the left TERENCE. There is opacification of the distal A1 segment and the A2 segment via the anterior communicating artery. No corresponding perfusionabnormality is noted, and this may be a chronic finding.    2.  Otherwise no evidence of major vascular stenosis or occlusion.    3.  Scattered atheromatous changes including mild (<50%) stenosis of the left ICA origin, moderate stenosis of the left vertebral artery proximal V4 segment, and calcific plaque of the carotid siphons with moderate left-sided stenoses.    4.  2 cm left thyroid nodule, recommend further evaluation with thyroid sonogram on an outpatient/elective basis.          7. Relevant blood tests: Direct LDL: 90 mg/dL [4 - 129] (07-17-20 @ 05:34)  Direct LDL: 91 mg/dL [4 - 129] (07-16-20 @ 16:25)  A1C with Estimated Average Glucose Result: 8.1         8. Relevant cardiac rhythm monitoring:  No reported events  9. Relevant Cardiac Structure: (TTE/ANTONIO +/-):[ ]No intracardiac thrombus/[ ] no vegetation/[ ]no [place]/EF:  < from: Transthoracic Echocardiogram (07.17.20 @ 15:10) >       Summary:   1. Normal global left ventricular systolic function.   2. LV Ejection Fraction by Sol's Method with a biplane EF of 63 %.   3. Color flow doppler and intravenous injection of agitated saline demonstrates the presence of an intact intra atrial septum.    Home Medications:  aspirin 81 mg oral tablet: 1 tab(s) orally once a day (16 Jul 2020 20:45)  benazepril 5 mg oral tablet: 1 tab(s) orally once a day (16 Jul 2020 20:45)  Crestor 10 mg oral tablet: 1 tab(s) orally once a day (16 Jul 2020 20:45)  glipiZIDE 2.5 mg oral tablet, extended release: 1 tab(s) orally once a day (in the morning) (16 Jul 2020 20:45)  glipiZIDE 5 mg oral tablet, extended release: 1 tab(s) orally once a day (at bedtime) (16 Jul 2020 20:45)  metFORMIN 1000 mg oral tablet: 1 tab(s) orally 2 times a day (16 Jul 2020 20:45)      MEDICATIONS  (STANDING):  aspirin  chewable 81 milliGRAM(s) Oral daily  atorvastatin 80 milliGRAM(s) Oral at bedtime  chlorhexidine 4% Liquid 1 Application(s) Topical <User Schedule>  dextrose 5%. 1000 milliLiter(s) (50 mL/Hr) IV Continuous <Continuous>  dextrose 50% Injectable 12.5 Gram(s) IV Push once  dextrose 50% Injectable 25 Gram(s) IV Push once  dextrose 50% Injectable 25 Gram(s) IV Push once  gemfibrozil 600 milliGRAM(s) Oral <User Schedule>  heparin   Injectable 5000 Unit(s) SubCutaneous every 12 hours  insulin glargine Injectable (LANTUS) 14 Unit(s) SubCutaneous at bedtime  insulin lispro (HumaLOG) corrective regimen sliding scale   SubCutaneous three times a day before meals  insulin lispro Injectable (HumaLOG) 5 Unit(s) SubCutaneous three times a day before meals  pantoprazole    Tablet 40 milliGRAM(s) Oral before breakfast      10. PT/OT/Speech/Rehab/S&Sw/ Cognitive eval results and recommendations: pending    11. Exam:    Vital Signs Last 24 Hrs  T(C): 36.1 (18 Jul 2020 05:44), Max: 36.9 (17 Jul 2020 10:09)  T(F): 97 (18 Jul 2020 05:44), Max: 98.4 (17 Jul 2020 10:09)  HR: 73 (18 Jul 2020 05:44) (68 - 83)  BP: 146/67 (18 Jul 2020 05:44) (123/56 - 146/67)  BP(mean): --  RR: 18 (18 Jul 2020 05:44) (18 - 20)  SpO2: 97% (17 Jul 2020 21:45) (97% - 99%)    12.     ***Left facial weakness with numbness and left forehead flattening. Left eye exhibits bells phenomenon.     NIH STROKE SCALE  Item	                                                        Score  1 a.	Level of Consciousness	               	0  1 b. LOC Questions	                                0  1 c.	LOC Commands	                               	0  2.	Best Gaze	                                        0  3.	Visual	                                                0  4.	Facial Palsy	                                   2  5 a.	Motor Arm - Left	                                0  5 b.	Motor Arm - Right	                        0  6 a.	Motor Leg - Left	                                0  6 b.	Motor Leg - Right	                                0  7.	Limb Ataxia	                                        0  8.	Sensory	                                               1  9.	Language	                                        0  10.	Dysarthria	                                        0  11.	Extinction and Inattention  	        0  ______________________________________  TOTAL	                                                        3       NIHSS today: 3    mRS:1  0 No symptoms at all  1 No significant disability despite symptoms; able to carry out all usual duties and activities without assistance  2 Slight disability; unable to carry out all previous activities, but able to look after own affairs  3 Moderate disability; requiring some help, but able to walk without assistance  4 Moderately severe disability; unable to walk without assistance and unable to attend to own bodily needs without assistance  5 Severe disability; bedridden, incontinent and requiring constant nursing care and attention  6 Dead

## 2020-07-18 NOTE — DISCHARGE NOTE NURSING/CASE MANAGEMENT/SOCIAL WORK - PATIENT PORTAL LINK FT
You can access the FollowMyHealth Patient Portal offered by [facility] by registering at the following website: [email or web address]. By joining [employer]’s FollowMyHealth portal, you will also be able to view your health information using other applications (apps) compatible with our system.

## 2020-07-18 NOTE — PROGRESS NOTE ADULT - ASSESSMENT
13. Impression:  A 59 yo woman with pmh of HTN, DM, DLD presents with acute onset of left lower facial weakness and numbness, NIHSS 2, Brain MRI negative for acute infarction. She is found to have incidental, asymptomatic severe stenosis of left TERENCE, scattered atheromatous mild stenosis of left ICA origin, moderate stenosis of left VA. B/l carotid duplex scan < 50% stenosis b/l ICA. She is also with incidental 9 mm ovoid lesion along the anterior/superior aspect of the pituitary gland suggestive of a proteinaceous or hemorrhagic cyst or cystic lesion.       14. Probable cause/s of Stroke:  Clinically, she continues to have left facial weakness and numbness and is more suggestive of Bell's Palsy than ischemic infarction/TIA.     15. Suggestions:   Routine stroke workup including:    - Due to multi scattered atheromatous intracranial stenosis she may benefit from ASA 81 mg daily and high dose statin therapy.  - May consider outpatient endocrinology and neurosurgery evaluation for Brain MRI findings  - Consider for Bell's Palsy valacyclovir 1000 mg TID x 1 week and Prednisone 60 mg QD x 1 week. However, will need to closely monitor her glucose level   - F/u PMD      16. Disposition:  - From neurovascular perspective may be discharged home  - Outpatient follow up with General Neurology clinic in 2-3 weeks     Nicholas Ferrara NP  [phone]

## 2020-07-18 NOTE — DISCHARGE NOTE PROVIDER - PROVIDER TOKENS
PROVIDER:[TOKEN:[[id number]],FOLLOWUP:[1 month]],PROVIDER:[TOKEN:[[id number]],FOLLOWUP:[2 weeks]]

## 2020-07-18 NOTE — DISCHARGE NOTE PROVIDER - CARE PROVIDERS DIRECT ADDRESSES
[email or web address]

## 2020-07-18 NOTE — DISCHARGE NOTE PROVIDER - NSDCCPCAREPLAN_GEN_ALL_CORE_FT
PRINCIPAL DISCHARGE DIAGNOSIS  Diagnosis: Numbness and tingling of left side of face  Assessment and Plan of Treatment: You were seen by our neuro team. You imaging showed blockage in your ICA. You were evaluated by our neuro interventional team and recommended aspirn and lipitor for prevention. They are also recommending you to be treated for suspected bells palsey. I have sent you steroids and valtrex. These will need to be taken for 7 days. The steroids will raise you sugar levels. Please continue you Diabestes meds and follow up with your primary care physician next week. Please follow up with neuro clinic in 3 weeks.      SECONDARY DISCHARGE DIAGNOSES  Diagnosis: Pituitary cyst  Assessment and Plan of Treatment: You were found to have a 9 mm leasion in the pituitary gland. Please follow up with Dr. Pope from neurosx outpt.

## 2020-07-18 NOTE — PROGRESS NOTE ADULT - ATTENDING COMMENTS
Patient examined in f/u for 7th nerve palsy.  Her left sided numbness resolved and imaging negative for stroke.  She needs eye ointment at night to avoid corneal ulcer and artificial tears during the day.  She needs 60 mg/day prednisone with GI prophylaxis for 7-10 days and treatment with Valcyclovir x 7 days.  Lyme test is recommended though no tick bites or rash reported.  Outpatient neurologic f/u in 1-2 weeks, call [phone] for appointment.
#Progress Note Handoff  Pending (specify): follow up neuro. neurosx , speech and swallow  Family discussion: martín pt and agreed to plan  Disposition: Home___/SNF___/Other___/Unknown at this time__x_  Pt seen during COVID 19 Pandemic.

## 2020-07-18 NOTE — DISCHARGE NOTE PROVIDER - NSDCMRMEDTOKEN_GEN_ALL_CORE_FT
aspirin 81 mg oral tablet: 1 tab(s) orally once a day  atorvastatin 80 mg oral tablet: 1 tab(s) orally once a day (at bedtime)  benazepril 5 mg oral tablet: 1 tab(s) orally once a day  gemfibrozil 600 mg oral tablet: 1 tab(s) orally once a day   glipiZIDE 2.5 mg oral tablet, extended release: 1 tab(s) orally once a day (in the morning)  glipiZIDE 5 mg oral tablet, extended release: 1 tab(s) orally once a day (at bedtime)  metFORMIN 1000 mg oral tablet: 1 tab(s) orally 2 times a day  pantoprazole 40 mg oral delayed release tablet: 1 tab(s) orally once a day (before a meal)  predniSONE 20 mg oral tablet: 3 tab(s) orally once aspirin 81 mg oral tablet: 1 tab(s) orally once a day  atorvastatin 80 mg oral tablet: 1 tab(s) orally once a day (at bedtime)  benazepril 5 mg oral tablet: 1 tab(s) orally once a day  gemfibrozil 600 mg oral tablet: 1 tab(s) orally once a day   glipiZIDE 2.5 mg oral tablet, extended release: 1 tab(s) orally once a day (in the morning)  glipiZIDE 5 mg oral tablet, extended release: 1 tab(s) orally once a day (at bedtime)  metFORMIN 1000 mg oral tablet: 1 tab(s) orally 2 times a day  pantoprazole 40 mg oral delayed release tablet: 1 tab(s) orally once a day (before a meal)  predniSONE 20 mg oral tablet: 3 tab(s) orally once   Valtrex 1 g oral tablet: 1 tab(s) orally 3 times a day

## 2020-07-20 PROBLEM — E78.5 HYPERLIPIDEMIA, UNSPECIFIED: Chronic | Status: ACTIVE | Noted: 2020-07-16

## 2020-07-20 PROBLEM — I10 ESSENTIAL (PRIMARY) HYPERTENSION: Chronic | Status: ACTIVE | Noted: 2020-07-16

## 2020-07-20 PROBLEM — Z00.00 ENCOUNTER FOR PREVENTIVE HEALTH EXAMINATION: Status: ACTIVE | Noted: 2020-07-20

## 2020-07-20 PROBLEM — E11.9 TYPE 2 DIABETES MELLITUS WITHOUT COMPLICATIONS: Chronic | Status: ACTIVE | Noted: 2020-07-16

## 2020-07-23 DIAGNOSIS — Z79.84 LONG TERM (CURRENT) USE OF ORAL HYPOGLYCEMIC DRUGS: ICD-10-CM

## 2020-07-23 DIAGNOSIS — E23.6 OTHER DISORDERS OF PITUITARY GLAND: ICD-10-CM

## 2020-07-23 DIAGNOSIS — G51.0 BELL'S PALSY: ICD-10-CM

## 2020-07-23 DIAGNOSIS — R29.810 FACIAL WEAKNESS: ICD-10-CM

## 2020-07-23 DIAGNOSIS — E78.1 PURE HYPERGLYCERIDEMIA: ICD-10-CM

## 2020-07-23 DIAGNOSIS — Z79.82 LONG TERM (CURRENT) USE OF ASPIRIN: ICD-10-CM

## 2020-07-23 DIAGNOSIS — E78.5 HYPERLIPIDEMIA, UNSPECIFIED: ICD-10-CM

## 2020-07-23 DIAGNOSIS — I65.23 OCCLUSION AND STENOSIS OF BILATERAL CAROTID ARTERIES: ICD-10-CM

## 2020-07-23 DIAGNOSIS — E04.1 NONTOXIC SINGLE THYROID NODULE: ICD-10-CM

## 2020-07-23 DIAGNOSIS — I66.12 OCCLUSION AND STENOSIS OF LEFT ANTERIOR CEREBRAL ARTERY: ICD-10-CM

## 2020-07-23 DIAGNOSIS — I10 ESSENTIAL (PRIMARY) HYPERTENSION: ICD-10-CM

## 2020-07-23 DIAGNOSIS — F17.210 NICOTINE DEPENDENCE, CIGARETTES, UNCOMPLICATED: ICD-10-CM

## 2020-07-23 DIAGNOSIS — E11.9 TYPE 2 DIABETES MELLITUS WITHOUT COMPLICATIONS: ICD-10-CM

## 2020-07-23 DIAGNOSIS — I65.02 OCCLUSION AND STENOSIS OF LEFT VERTEBRAL ARTERY: ICD-10-CM

## 2020-08-04 VITALS — HEIGHT: 64 IN | WEIGHT: 170 LBS | BODY MASS INDEX: 29.02 KG/M2

## 2020-08-04 DIAGNOSIS — F17.200 NICOTINE DEPENDENCE, UNSPECIFIED, UNCOMPLICATED: ICD-10-CM

## 2020-08-04 DIAGNOSIS — Z78.9 OTHER SPECIFIED HEALTH STATUS: ICD-10-CM

## 2020-08-04 DIAGNOSIS — Z86.39 PERSONAL HISTORY OF OTHER ENDOCRINE, NUTRITIONAL AND METABOLIC DISEASE: ICD-10-CM

## 2020-08-04 DIAGNOSIS — E78.5 HYPERLIPIDEMIA, UNSPECIFIED: ICD-10-CM

## 2020-08-04 DIAGNOSIS — Z86.79 PERSONAL HISTORY OF OTHER DISEASES OF THE CIRCULATORY SYSTEM: ICD-10-CM

## 2020-08-04 RX ORDER — ASPIRIN 81 MG
81 TABLET,CHEWABLE ORAL
Refills: 0 | Status: ACTIVE | COMMUNITY

## 2020-08-04 RX ORDER — BENAZEPRIL HYDROCHLORIDE 10 MG/1
10 TABLET, FILM COATED ORAL
Refills: 0 | Status: ACTIVE | COMMUNITY

## 2020-08-04 RX ORDER — UBIDECARENONE/VIT E ACET 100MG-5
CAPSULE ORAL
Refills: 0 | Status: ACTIVE | COMMUNITY

## 2020-08-04 RX ORDER — VALACYCLOVIR HYDROCHLORIDE 1 G/1
1 TABLET, FILM COATED ORAL
Refills: 0 | Status: ACTIVE | COMMUNITY

## 2020-08-04 RX ORDER — ATORVASTATIN CALCIUM 80 MG/1
80 TABLET, FILM COATED ORAL
Refills: 0 | Status: ACTIVE | COMMUNITY

## 2020-08-05 ENCOUNTER — APPOINTMENT (OUTPATIENT)
Dept: NEUROSURGERY | Facility: CLINIC | Age: 60
End: 2020-08-05
Payer: COMMERCIAL

## 2020-08-05 PROCEDURE — 99213 OFFICE O/P EST LOW 20 MIN: CPT

## 2020-08-06 NOTE — HISTORY OF PRESENT ILLNESS
[FreeTextEntry1] : Ms. Buckner presents today after being hospitalized on July 16, 2020 for left bell palsy, incidentally found on MRI brain to have a 9mm pituitary lesion. Today, patient continues to have palsy on her left facial. She went to the ophthalmologist for a visual exam, which was unremarkable. She is schedule to see neurology in 2 weeks. Denies H/A, visual disturbances, paresthesia, vertigo, gait/balance disturbances, heat/cold intolerance.

## 2020-08-17 ENCOUNTER — APPOINTMENT (OUTPATIENT)
Dept: NEUROLOGY | Facility: CLINIC | Age: 60
End: 2020-08-17
Payer: COMMERCIAL

## 2020-08-17 PROCEDURE — 99213 OFFICE O/P EST LOW 20 MIN: CPT | Mod: 95

## 2020-08-17 NOTE — HISTORY OF PRESENT ILLNESS
[FreeTextEntry1] : 61 yo female with pmh of HTN, DM, DLD presents with acute onset of left lower\par facial weakness and numbness since this morning. She said yesterday her left\par eye began tearing, was unable to close it. This morning, around 10AM, she began\par to feel like the left side of her face was numb and her lip was drooping. She\par denies any chest pain, palpitations, dizziness, headaches, incontinence,\par abdominal pain, fevers, diarrhea, nausea, vomiting, vision or hearing changes.\par She has never had symptoms like this before.\par ED Course: NIHSS was 2 for facial weakness and numbness. CTH is negative for\par acute pathology and CTP showed no penumbra, CTA with multifocal intracranial\par stenosis. She was not a tPA candidate. Loaded with aspirin and statin in ED.\par \par

## 2020-08-22 ENCOUNTER — RESULT REVIEW (OUTPATIENT)
Age: 60
End: 2020-08-22

## 2020-08-22 ENCOUNTER — OUTPATIENT (OUTPATIENT)
Dept: OUTPATIENT SERVICES | Facility: HOSPITAL | Age: 60
LOS: 1 days | Discharge: HOME | End: 2020-08-22
Payer: COMMERCIAL

## 2020-08-22 DIAGNOSIS — D35.2 BENIGN NEOPLASM OF PITUITARY GLAND: ICD-10-CM

## 2020-08-22 PROCEDURE — 70553 MRI BRAIN STEM W/O & W/DYE: CPT | Mod: 26

## 2020-09-03 DIAGNOSIS — G51.0 BELL'S PALSY: ICD-10-CM

## 2020-09-24 ENCOUNTER — APPOINTMENT (OUTPATIENT)
Dept: NEUROSURGERY | Facility: CLINIC | Age: 60
End: 2020-09-24
Payer: COMMERCIAL

## 2020-09-24 PROCEDURE — 99214 OFFICE O/P EST MOD 30 MIN: CPT | Mod: 95

## 2020-09-24 NOTE — HISTORY OF PRESENT ILLNESS
[FreeTextEntry1] : Briefly, Ms. Buckner presented to [facility] with a left [place] palsy in July 2020.  It has not recovered.  Incidentally, a pituitary lesion, roughly 11mm was discovered.  It abuts but does not displace the chiasm.\par \par MRI sella 8/2020 I consider to be her baseline as this is a true sella protocol.  On my interpretation it has not changed when compared to 1 month prior MRI, although this was not a sella protocol.\par \par Formal visual field testing by her ophthamologist negative for field cut\par \par Endocrine panel pending

## 2020-09-24 NOTE — ASSESSMENT
[FreeTextEntry1] : Endocrine panel ordered, will be drawn on 10/1\par She will follow-up with me 10/5 for these results.\par \par Provided there is no hormonal abnormality, options are serial imaging vs transphenoidal resection.\par \par If serial imaging is preferred, I recommend MRI sella with and without end of November for follow-up.  After that 6 months then yearly.\par \par This consultation took 25 minutes

## 2020-10-07 ENCOUNTER — TRANSCRIPTION ENCOUNTER (OUTPATIENT)
Age: 60
End: 2020-10-07

## 2020-10-07 ENCOUNTER — APPOINTMENT (OUTPATIENT)
Dept: NEUROSURGERY | Facility: CLINIC | Age: 60
End: 2020-10-07
Payer: COMMERCIAL

## 2020-10-07 DIAGNOSIS — D35.2 BENIGN NEOPLASM OF PITUITARY GLAND: ICD-10-CM

## 2020-10-07 PROCEDURE — 99212 OFFICE O/P EST SF 10 MIN: CPT | Mod: 95

## 2020-10-08 PROBLEM — D35.2 BENIGN TUMOR OF PITUITARY GLAND: Status: ACTIVE | Noted: 2020-08-05

## 2020-10-08 NOTE — HISTORY OF PRESENT ILLNESS
[Home] : at home, [unfilled] , at the time of the visit. [Medical Office: ([facility])___] : at the medical office located in  [Verbal consent obtained from patient] : the patient, [unfilled] [FreeTextEntry1] : MRI - pituitary mass, contact with optic chiasm, no compression\par ophtho exam normal\par endocrine panel normal\par \par Discussed following this lesion with serial imaging.\par \par MRI sella ordered for 6 months from now.\par \par This consultation took 10 minutes.

## 2022-11-20 ENCOUNTER — EMERGENCY (EMERGENCY)
Facility: HOSPITAL | Age: 62
LOS: 0 days | Discharge: HOME | End: 2022-11-20
Attending: STUDENT IN AN ORGANIZED HEALTH CARE EDUCATION/TRAINING PROGRAM | Admitting: STUDENT IN AN ORGANIZED HEALTH CARE EDUCATION/TRAINING PROGRAM

## 2022-11-20 VITALS
DIASTOLIC BLOOD PRESSURE: 67 MMHG | OXYGEN SATURATION: 98 % | TEMPERATURE: 99 F | SYSTOLIC BLOOD PRESSURE: 148 MMHG | RESPIRATION RATE: 18 BRPM | HEART RATE: 90 BPM

## 2022-11-20 DIAGNOSIS — E11.9 TYPE 2 DIABETES MELLITUS WITHOUT COMPLICATIONS: ICD-10-CM

## 2022-11-20 DIAGNOSIS — M54.50 LOW BACK PAIN, UNSPECIFIED: ICD-10-CM

## 2022-11-20 DIAGNOSIS — I10 ESSENTIAL (PRIMARY) HYPERTENSION: ICD-10-CM

## 2022-11-20 DIAGNOSIS — M79.604 PAIN IN RIGHT LEG: ICD-10-CM

## 2022-11-20 DIAGNOSIS — E78.00 PURE HYPERCHOLESTEROLEMIA, UNSPECIFIED: ICD-10-CM

## 2022-11-20 DIAGNOSIS — Z79.82 LONG TERM (CURRENT) USE OF ASPIRIN: ICD-10-CM

## 2022-11-20 DIAGNOSIS — Z79.84 LONG TERM (CURRENT) USE OF ORAL HYPOGLYCEMIC DRUGS: ICD-10-CM

## 2022-11-20 PROCEDURE — 99284 EMERGENCY DEPT VISIT MOD MDM: CPT

## 2022-11-20 RX ORDER — DEXAMETHASONE 0.5 MG/5ML
10 ELIXIR ORAL ONCE
Refills: 0 | Status: COMPLETED | OUTPATIENT
Start: 2022-11-20 | End: 2022-11-20

## 2022-11-20 RX ORDER — DIAZEPAM 5 MG
5 TABLET ORAL ONCE
Refills: 0 | Status: DISCONTINUED | OUTPATIENT
Start: 2022-11-20 | End: 2022-11-20

## 2022-11-20 RX ORDER — LIDOCAINE 4 G/100G
1 CREAM TOPICAL
Qty: 10 | Refills: 0
Start: 2022-11-20 | End: 2022-11-29

## 2022-11-20 RX ORDER — LIDOCAINE 4 G/100G
1 CREAM TOPICAL ONCE
Refills: 0 | Status: COMPLETED | OUTPATIENT
Start: 2022-11-20 | End: 2022-11-20

## 2022-11-20 RX ORDER — TIZANIDINE 4 MG/1
1 TABLET ORAL
Qty: 28 | Refills: 0
Start: 2022-11-20 | End: 2022-11-26

## 2022-11-20 RX ORDER — KETOROLAC TROMETHAMINE 30 MG/ML
60 SYRINGE (ML) INJECTION ONCE
Refills: 0 | Status: DISCONTINUED | OUTPATIENT
Start: 2022-11-20 | End: 2022-11-20

## 2022-11-20 RX ADMIN — LIDOCAINE 1 PATCH: 4 CREAM TOPICAL at 16:43

## 2022-11-20 RX ADMIN — Medication 60 MILLIGRAM(S): at 16:07

## 2022-11-20 RX ADMIN — Medication 10 MILLIGRAM(S): at 16:06

## 2022-11-20 RX ADMIN — Medication 5 MILLIGRAM(S): at 16:06

## 2022-11-20 NOTE — ED PROVIDER NOTE - PATIENT PORTAL LINK FT
You can access the FollowMyHealth Patient Portal offered by [facility] by registering at the following website: [email or web address]. By joining [employer]’s FollowMyHealth portal, you will also be able to view your health information using other applications (apps) compatible with our system.

## 2022-11-20 NOTE — ED PROVIDER NOTE - ATTENDING CONTRIBUTION TO CARE
82-year-old female with a past medical history significant for diabetes, hyperlipidemia, who presents with right buttocks pain. Patient states that she has been having right buttocks pain rating down her right leg since Sunday. Patient states that the symptoms are similar to her past episodes of sciatica. Patient denies any fevers IV drug use numbness tingling fecal or urinary incontinence or any other medical complaints.    VITAL SIGNS: I have reviewed nursing notes and confirm.  CONSTITUTIONAL: non-toxic, well appearing  SKIN: no rash, no petechiae.  EYES: EOMI, pink conjunctiva, anicteric  ENT: tongue midline, no exudates, MMM  NECK: Supple; no meningismus, no JVD  CARD: RRR, no murmurs, equal radial pulses bilaterally 2+  RESP: CTAB, no respiratory distress  ABD: Soft, non-tender, non-distended, no peritoneal signs, no HSM, no CVA tenderness  EXT: Normal ROM x4. No edema. No calves tenderness  NEURO: Alert, oriented x3    a/p  82 yr old f that presents with back pain probably 2/2 msk. pain meds given. pt reports improvement after evaluation. pt to be discharged with pcp follow up and strict return precautions.

## 2022-11-20 NOTE — ED PROVIDER NOTE - NSICDXFAMILYHX_GEN_ALL_CORE_FT
FAMILY HISTORY:  FH: type 2 diabetes, father    Father  Still living? Unknown  Family history of Bell's palsy, Age at diagnosis: Age Unknown

## 2022-11-20 NOTE — ED PROVIDER NOTE - OBJECTIVE STATEMENT
62-year-old female with history of diabetes, high cholesterol presents to the ED complaining of right buttock pain rating down the leg after getting into a low car last Sunday.  Patient states pain is worse with walking and different movements.  Patient states has been taking naproxen and cyclobenzaprine with minimal relief.  No loss of urine or stool, saddle anesthesia, weakness numbness abdominal pain nausea vomiting diarrhea fevers or chills.

## 2022-11-20 NOTE — ED PROVIDER NOTE - NSFOLLOWUPINSTRUCTIONS_ED_ALL_ED_FT
Our Emergency Department Referral Coordinators will be reaching out ot you in the next 24-48 hours from 9:00am to 5:00pm (Monday to Friday) with a follow up appointment. Please expect a phone call from the hospital in that time frame. If you do not receive a call or if you have any questions or concerns, you can reach them at [phone] or [phone].    Back Pain    Back pain is very common in adults. The cause of back pain is rarely dangerous and the pain often gets better over time. The cause of your back pain may not be known and may include strain of muscles or ligaments, degeneration of the spinal disks, or arthritis. Occasionally the pain may radiate down your leg(s). Over-the-counter medicines to reduce pain and inflammation are often the most helpful. Stretching and remaining active frequently helps the healing process.     SEEK IMMEDIATE MEDICAL CARE IF YOU HAVE ANY OF THE FOLLOWING SYMPTOMS: bowel or bladder control problems, unusual weakness or numbness in your arms or legs, nausea or vomiting, abdominal pain, fever, dizziness/lightheadedness.

## 2022-11-20 NOTE — ED PROVIDER NOTE - CARE PROVIDER_API CALL
Nicole Pope)  Neurosurgery  [address] [facility], [address]  [place] [address]  Phone: [phone]  Fax: [phone]  Follow Up Time:

## 2022-11-20 NOTE — ED PROVIDER NOTE - CLINICAL SUMMARY MEDICAL DECISION MAKING FREE TEXT BOX
82 yr old f that presents with back pain probably 2/2 msk. pain meds given. pt reports improvement after evaluation. pt to be discharged with pcp follow up and strict return precautions.     I have discussed the discharge plan with the patient. The patient agrees with the plan, as discussed.  The patient understands Emergency Department diagnosis is a preliminary diagnosis often based on limited information and that the patient must adhere to the follow-up plan as discussed.  The patient understands that if the symptoms worsen or if prescribed medications do not have the desired/planned effect that the patient may return to the Emergency Department at any time for further evaluation and treatment.

## 2022-12-02 ENCOUNTER — APPOINTMENT (OUTPATIENT)
Dept: NEUROSURGERY | Facility: CLINIC | Age: 62
End: 2022-12-02

## 2022-12-02 ENCOUNTER — OUTPATIENT (OUTPATIENT)
Dept: OUTPATIENT SERVICES | Facility: HOSPITAL | Age: 62
LOS: 1 days | Discharge: HOME | End: 2022-12-02

## 2022-12-02 VITALS — BODY MASS INDEX: 30.73 KG/M2 | WEIGHT: 180 LBS | HEIGHT: 64 IN

## 2022-12-02 DIAGNOSIS — M54.50 LOW BACK PAIN, UNSPECIFIED: ICD-10-CM

## 2022-12-02 PROCEDURE — 72110 X-RAY EXAM L-2 SPINE 4/>VWS: CPT | Mod: 26

## 2022-12-02 PROCEDURE — 99214 OFFICE O/P EST MOD 30 MIN: CPT

## 2022-12-02 RX ORDER — GLIPIZIDE 2.5 MG/1
2.5 TABLET, EXTENDED RELEASE ORAL
Refills: 0 | Status: ACTIVE | COMMUNITY

## 2022-12-02 RX ORDER — METHYLPREDNISOLONE 4 MG/1
4 TABLET ORAL
Qty: 21 | Refills: 0 | Status: ACTIVE | COMMUNITY
Start: 2022-12-02 | End: 1900-01-01

## 2022-12-02 RX ORDER — TIZANIDINE 4 MG/1
4 TABLET ORAL 3 TIMES DAILY
Qty: 60 | Refills: 0 | Status: ACTIVE | COMMUNITY
Start: 2022-12-02 | End: 1900-01-01

## 2022-12-02 RX ORDER — METFORMIN HYDROCHLORIDE 500 MG/1
500 TABLET, COATED ORAL
Refills: 0 | Status: ACTIVE | COMMUNITY

## 2022-12-02 RX ORDER — IBUPROFEN 600 MG/1
600 TABLET, FILM COATED ORAL
Qty: 30 | Refills: 0 | Status: ACTIVE | COMMUNITY
Start: 2022-12-02 | End: 1900-01-01

## 2022-12-02 RX ORDER — GEMFIBROZIL 600 MG/1
600 TABLET, FILM COATED ORAL
Refills: 0 | Status: ACTIVE | COMMUNITY

## 2022-12-02 RX ORDER — FAMOTIDINE 40 MG/1
40 TABLET, FILM COATED ORAL DAILY
Qty: 10 | Refills: 0 | Status: ACTIVE | COMMUNITY
Start: 2022-12-02 | End: 1900-01-01

## 2022-12-02 NOTE — HISTORY OF PRESENT ILLNESS
[de-identified] : Mrs. Buckner is a very pleasant 62-year-old woman was accompanied today by her  John who reports that she began experiencing 3 weeks ago acute onset right buttock pain with radiation to the right lateral calf when she was getting into her 's car.  She denies any weakness, numbness, tingling down her arms or legs.  She denies any difficulties or changes in bowel or bladder function.  She does have an antalgic gait secondary to her right buttock pain.  She initially was evaluated at the urgent care 4 days after the onset and was prescribed naproxen and cyclobenzaprine.  She did not receive benefit from this and simply went to the emergency room where she was prescribed lidocaine patches and tizanidine.  She does note that the tizanidine does help her slightly.  She notes that the pain is described as a muscle pulling and and tightness and notes that it is intermittent.  The pain is tolerable when she is sitting and worsened with ambulation and movement.  She notes that the pain is also worsened on extension.  She has not yet done physical therapy or been evaluated by pain management.\par \par Review of systems negative for changes in vision, hearing, smell, taste, peripheral swelling, chest pain, shortness of breath, changes in GI or .\par \par Past medical history: Diabetes\par \par Past surgical history none\par \par Allergies none\par \par Social history denies smoking, drinking, drugs.  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 Garcia is awake alert and oriented to person place and time.  Pupils are equal round reactive to light, extraocular muscle intact, her face is symmetric with normal sensation to light touch in the V1 to V3 distributions bilaterally.  She has full palate raise and her tongue is midline on protrusion testing.  She is grossly 5 out of 5 in bilateral shoulder shrugs.\par \par On motor strength testing:\par \par She is grossly 5 out of 5 in the bilateral upper extremities in the deltoids, biceps, triceps, wrist flexion, wrist extension, hand , interossei.\par \par She is grossly 5 out of 5 in the bilateral lower extremities as well in the iliopsoas, hamstrings, quadriceps, knee flexion, knee extension, ankle dorsiflexion and plantarflexion, EHL.\par \par Sensation to light touch is grossly intact in all 4 extremities.\par \par She has 1+ bilateral bicep tendon reflexes 1+ bilateral deep tendon reflexes.  She does not have clonus bilaterally she does not have a Hernandez sign bilaterally.\par \par She has negative straight leg raise bilaterally.\par \par No saddle anesthesia at this time.\par \par \par \par

## 2022-12-02 NOTE — ASSESSMENT
[FreeTextEntry1] : Mrs. Buckner is a very pleasant 62-year-old woman with acute onset right buttock pain with radiation to the right lateral calf in a L5-S1 distribution with no prior imaging and additional room for conservative therapies.\par \par We will obtain lumbar flexion-extension x-rays.\par \par We discussed multimodal pain management regimen including 1 g of Tylenol every 8 hours for 3 days and then as needed for mild pain, 600 mg ibuprofen every 6 hours and then as needed for mild pain, tizanidine 4 mg every 6 hours as needed for muscle spasm, Medrol Dosepak, Pepcid 40 mg 1 time daily for 7 days.\par \par We discussed the importance of initiating this multimodal pain regimen as well as initiating 6 to 8 weeks of physical therapy focused around lumbar lower back pain, lumbar strengthening, core strengthening, low impact aerobic exercise activity such as a stationary bicycle to help promote mild weight loss of 5 to 10 pounds.\par \par Mrs. Buckner will follow-up with us in 8 weeks after the completion of the above regimen to see how she is doing.  She can follow-up with us sooner should she develop any new signs or symptoms of neurologic dysfunction.  If she is unable to participate in the above regimen because of increased pain or symptoms we will proceed with ordering a lumbar MRI to evaluate for any compressive pathologies.  At that time we will determine next steps possibly to include pain management as well.  All her and her 's questions were answered, they were in agreement with the plan above, and were happy with the care they received you today.  Thank you for allow me to participate in the care of this patient.\par \par \par Sincerely,\par \par Myron Herrera MD\par Department of Neurosurgery\par [facility]\par \par \par \par

## 2022-12-28 ENCOUNTER — OUTPATIENT (OUTPATIENT)
Dept: OUTPATIENT SERVICES | Facility: HOSPITAL | Age: 62
LOS: 1 days | Discharge: HOME | End: 2022-12-28

## 2022-12-28 DIAGNOSIS — S33.5XXA SPRAIN OF LIGAMENTS OF LUMBAR SPINE, INITIAL ENCOUNTER: ICD-10-CM

## 2023-02-24 ENCOUNTER — APPOINTMENT (OUTPATIENT)
Dept: NEUROSURGERY | Facility: CLINIC | Age: 63
End: 2023-02-24
Payer: COMMERCIAL

## 2023-02-24 VITALS — HEIGHT: 64 IN | BODY MASS INDEX: 30.73 KG/M2 | WEIGHT: 180 LBS

## 2023-02-24 DIAGNOSIS — M54.50 LOW BACK PAIN, UNSPECIFIED: ICD-10-CM

## 2023-02-24 DIAGNOSIS — M54.10 RADICULOPATHY, SITE UNSPECIFIED: ICD-10-CM

## 2023-02-24 PROCEDURE — 99213 OFFICE O/P EST LOW 20 MIN: CPT

## 2023-02-24 NOTE — ASSESSMENT
[FreeTextEntry1] : Mrs. Buckner is a very pleasant 62-year-old woman previously seen for acute onset right buttock pain with radiation to the right lateral calf in a L5-S1 distribution; currently improved and without the symptoms status post conservative therapy with medical management and physical therapy.  She is neurologically improved at this time and without symptoms.\par \par May follow-up on an as-needed basis.  All her questions were answered and she was in full agreement the plan above.  Thank you for allow me to participate in your care.\par \par Sincerely,\par \par Myron Herrera MD\par Department of Neurosurgery\par [facility]\par

## 2023-02-24 NOTE — REASON FOR VISIT
[Follow-Up: _____] : a [unfilled] follow-up visit [FreeTextEntry1] : Mrs. Buckner is a very pleasant 62-year-old woman previously seen for acute onset right buttock pain with radiation to the right lateral calf in a L5-S1 distribution; she was recommended a course of conservative medical management as well as physical therapy and returns today having done this.  She is currently without lower back buttock or radicular pain.  She denies any new weakness numbness or tingling rating down her arms or legs no difficulties with bowel or bladder function and ambulates without any difficulties.  She is greatly improved and without neurologic symptoms at this time.\par \par Physical exam:\par \par She is awake alert oriented person place and time pupils equal round reactive to light extraocular muscles intact face is symmetric speech is clear and fluent.\par \par She is 5 out of 5 bilateral upper extremities and lower extremities throughout.\par \par Sensation to light touch is intact in all 4 extremities, no saddle anesthesia\par \par She has 1+ bicep and patellar deep tendon reflexes bilaterally\par \par No ankle clonus bilaterally\par \par Negative straight leg raise bilaterally\par \par Normal tandem nonantalgic gait

## 2023-04-24 NOTE — DISCHARGE NOTE NURSING/CASE MANAGEMENT/SOCIAL WORK - NSDPDISTO_GEN_ALL_CORE
CC: Follow-up on critical illness myopathy    Interview History/HPI: Patient is without complaints, he feels like he is getting stronger, he denies chest pain or shortness of breath, no further bleeding.  No abdominal pain, he is getting dialysis currently Tuesday Thursday Saturday.  He has a PD catheter in place with nursing staff site looks good, currently dressed, I did discuss with nephrology, he is going to go to [facility] to get this worked on as it is apparently not functioning          Current Hospital Meds:  allopurinol, 50 mg, Oral, Once per day on Mon Thu  amLODIPine, 5 mg, Oral, Q24H  aspirin, 81 mg, Oral, Daily  atorvastatin, 40 mg, Oral, Nightly  carvedilol, 25 mg, Oral, BID With Meals  castor oil-balsam peru, 1 application, Topical, Q12H  epoetin nirmala/nirmala-epbx, 10,000 Units, Subcutaneous, Weekly  gentamicin, 1 application, Topical, Daily  insulin glargine, 15 Units, Subcutaneous, Nightly  insulin lispro, 2-7 Units, Subcutaneous, TID With Meals  lidocaine, 1 patch, Transdermal, Daily  melatonin, 10 mg, Oral, Nightly  multivitamin, 1 tablet, Oral, Daily  OLANZapine zydis, 5 mg, Oral, Daily  pantoprazole, 40 mg, Oral, BID AC  senna-docusate sodium, 2 tablet, Oral, Nightly  sodium chloride, 10 mL, Intravenous, Q12H  sodium chloride, 10 mL, Intravenous, Q12H  sodium chloride, 10 mL, Intravenous, Q12H    sodium chloride, 9 mL/hr        Vitals:    04/24/23 0814   BP: 103/63   Pulse: 84   Resp:    Temp:    SpO2:          Intake/Output Summary (Last 24 hours) at 4/24/2023 1035  Last data filed at 4/24/2023 0900  Gross per 24 hour   Intake 960 ml   Output 475 ml   Net 485 ml       EXAM: Lungs have bilateral breath sounds are clear, heart regular rate and rhythm without murmur, the dialysis catheter right upper chest area looks good.  No edema.  Strength is symmetric.  103/63, 98, respiratory rate 20, saturation room air 99%.      Diet: Diabetic Diets; Consistent Carbohydrate; Texture: Regular  Texture (IDDSI 7); Fluid Consistency: Thin (IDDSI 0)        LABS:     Lab Results (last 48 hours)     Procedure Component Value Units Date/Time    POC Glucose Once [[id number]]  (Abnormal) Collected: 04/24/23 0600    Specimen: Blood Updated: 04/24/23 0606     Glucose 135 mg/dL      Comment: Meter: [id number] : [id number] HERMES SMITH       POC Glucose Once [[id number]]  (Abnormal) Collected: 04/23/23 1537    Specimen: Blood Updated: 04/23/23 1544     Glucose 263 mg/dL      Comment: Meter: [id number] : [id number] REBECCA TOMPKINS       POC Glucose Once [[id number]]  (Abnormal) Collected: 04/23/23 1052    Specimen: Blood Updated: 04/23/23 1058     Glucose 244 mg/dL      Comment: Meter: [id number] : [id number] TRISTEN CASTELLANOS       POC Glucose Once [[id number]]  (Abnormal) Collected: 04/23/23 0602    Specimen: Blood Updated: 04/23/23 0611     Glucose 137 mg/dL      Comment: Meter: [id number] : [id number] Homar Garrettranjeet Rich       POC Glucose Once [[id number]]  (Normal) Collected: 04/22/23 1744    Specimen: Blood Updated: 04/22/23 1750     Glucose 108 mg/dL      Comment: Meter: [id number] : [id number] Gregorio Downing       POC Glucose Once [[id number]]  (Abnormal) Collected: 04/22/23 1102    Specimen: Blood Updated: 04/22/23 1113     Glucose 175 mg/dL      Comment: Meter: [id number] : [id number] bill valencia                  Radiology:    Imaging Results (Last 72 Hours)     ** No results found for the last 72 hours. **          Results for orders placed during the hospital encounter of 05/22/21    Adult Transthoracic Echo Complete w/ Color, Spectral and Contrast if necessary per protocol    Interpretation Summary  · Left ventricular systolic function is normal. Estimated left ventricular EF = 60%.  · Left ventricular diastolic function is consistent with (grade I) impaired relaxation.  · Mild aortic valve stenosis is present.  · Estimated right ventricular systolic pressure from tricuspid regurgitation  is normal (<35 mmHg).      Assessment/Plan:   Critical illness myopathy, patient has progressed well with physical and Occupational Therapy.  With physical therapy, patient is standby assist for bed mobility, standby assist for bed to chair and chair to bed.  Standby assist for sit to stand and stand to sit, patient walked 320 feet with a front wheel walker standby assist.  Still working on balance maneuvers as well.  Patient is working on ADLs as well as transfers and motor skills with OT.  Currently plans for discharge home Wednesday.    ESRD, continue dialysis Tuesday Thursday Saturday as an outpatient, he will follow-up with  for PD catheter, also discussed with Dr.Sroya allen however patient is having no symptoms with this, no inflammation by CT, most likely not pursue cholecystectomy as an outpatient unless patient has symptoms.  Dialysis catheter site looks good.    Anemia secondary to acute blood loss from peptic ulcer disease. Hemoglobin has been stable, will recheck in a.m.  On SCUDs for DVT prophylaxis, patient is also on EPO per nephrology.  Patient is on PPI    Hypertension, patient has both been borderline hypotensive, I have decreased his amlodipine and Coreg, hold parameters, will follow.    Diabetes, currently controlled on low-dose sliding scale and Lantus, patient was on Glucotrol and Lantus at home.    Coronary disease status post CABG in the past with stents.  On aspirin Coreg and statin.  Continue.    Delirium, appears to be improved.  Patient states she is starting to feel back to what he considers his old self      Aroldo Malagon MD      Home

## 2023-07-18 ENCOUNTER — EMERGENCY (EMERGENCY)
Facility: HOSPITAL | Age: 63
LOS: 0 days | Discharge: HOME | End: 2023-07-19
Attending: EMERGENCY MEDICINE
Payer: COMMERCIAL

## 2023-07-18 VITALS
SYSTOLIC BLOOD PRESSURE: 145 MMHG | HEIGHT: 64 IN | HEART RATE: 93 BPM | OXYGEN SATURATION: 100 % | RESPIRATION RATE: 19 BRPM | TEMPERATURE: 98 F | WEIGHT: 179.02 LBS | DIASTOLIC BLOOD PRESSURE: 67 MMHG

## 2023-07-18 VITALS
OXYGEN SATURATION: 100 % | SYSTOLIC BLOOD PRESSURE: 123 MMHG | DIASTOLIC BLOOD PRESSURE: 59 MMHG | RESPIRATION RATE: 18 BRPM | HEART RATE: 82 BPM

## 2023-07-18 DIAGNOSIS — Z83.3 FAMILY HISTORY OF DIABETES MELLITUS: ICD-10-CM

## 2023-07-18 DIAGNOSIS — R06.02 SHORTNESS OF BREATH: ICD-10-CM

## 2023-07-18 DIAGNOSIS — F41.9 ANXIETY DISORDER, UNSPECIFIED: ICD-10-CM

## 2023-07-18 DIAGNOSIS — E78.5 HYPERLIPIDEMIA, UNSPECIFIED: ICD-10-CM

## 2023-07-18 DIAGNOSIS — Z79.82 LONG TERM (CURRENT) USE OF ASPIRIN: ICD-10-CM

## 2023-07-18 DIAGNOSIS — E11.9 TYPE 2 DIABETES MELLITUS WITHOUT COMPLICATIONS: ICD-10-CM

## 2023-07-18 DIAGNOSIS — I10 ESSENTIAL (PRIMARY) HYPERTENSION: ICD-10-CM

## 2023-07-18 DIAGNOSIS — Z79.84 LONG TERM (CURRENT) USE OF ORAL HYPOGLYCEMIC DRUGS: ICD-10-CM

## 2023-07-18 LAB
ALBUMIN SERPL ELPH-MCNC: 4.5 G/DL — SIGNIFICANT CHANGE UP (ref 3.5–5.2)
ALP SERPL-CCNC: 80 U/L — SIGNIFICANT CHANGE UP (ref 30–115)
ALT FLD-CCNC: 19 U/L — SIGNIFICANT CHANGE UP (ref 0–41)
ANION GAP SERPL CALC-SCNC: 15 MMOL/L — HIGH (ref 7–14)
AST SERPL-CCNC: 14 U/L — SIGNIFICANT CHANGE UP (ref 0–41)
BASOPHILS # BLD AUTO: 0.07 K/UL — SIGNIFICANT CHANGE UP (ref 0–0.2)
BASOPHILS NFR BLD AUTO: 0.4 % — SIGNIFICANT CHANGE UP (ref 0–1)
BILIRUB SERPL-MCNC: 0.4 MG/DL — SIGNIFICANT CHANGE UP (ref 0.2–1.2)
BUN SERPL-MCNC: 23 MG/DL — HIGH (ref 10–20)
CALCIUM SERPL-MCNC: 10.2 MG/DL — SIGNIFICANT CHANGE UP (ref 8.4–10.5)
CHLORIDE SERPL-SCNC: 101 MMOL/L — SIGNIFICANT CHANGE UP (ref 98–110)
CO2 SERPL-SCNC: 24 MMOL/L — SIGNIFICANT CHANGE UP (ref 17–32)
CREAT SERPL-MCNC: 0.9 MG/DL — SIGNIFICANT CHANGE UP (ref 0.7–1.5)
EGFR: 72 ML/MIN/1.73M2 — SIGNIFICANT CHANGE UP
EOSINOPHIL # BLD AUTO: 0.11 K/UL — SIGNIFICANT CHANGE UP (ref 0–0.7)
EOSINOPHIL NFR BLD AUTO: 0.6 % — SIGNIFICANT CHANGE UP (ref 0–8)
GLUCOSE SERPL-MCNC: 93 MG/DL — SIGNIFICANT CHANGE UP (ref 70–99)
HCT VFR BLD CALC: 37.6 % — SIGNIFICANT CHANGE UP (ref 37–47)
HGB BLD-MCNC: 13.1 G/DL — SIGNIFICANT CHANGE UP (ref 12–16)
IMM GRANULOCYTES NFR BLD AUTO: 1 % — HIGH (ref 0.1–0.3)
LYMPHOCYTES # BLD AUTO: 20.5 % — SIGNIFICANT CHANGE UP (ref 20.5–51.1)
LYMPHOCYTES # BLD AUTO: 3.7 K/UL — HIGH (ref 1.2–3.4)
MCHC RBC-ENTMCNC: 31.6 PG — HIGH (ref 27–31)
MCHC RBC-ENTMCNC: 34.8 G/DL — SIGNIFICANT CHANGE UP (ref 32–37)
MCV RBC AUTO: 90.6 FL — SIGNIFICANT CHANGE UP (ref 81–99)
MONOCYTES # BLD AUTO: 1.12 K/UL — HIGH (ref 0.1–0.6)
MONOCYTES NFR BLD AUTO: 6.2 % — SIGNIFICANT CHANGE UP (ref 1.7–9.3)
NEUTROPHILS # BLD AUTO: 12.88 K/UL — HIGH (ref 1.4–6.5)
NEUTROPHILS NFR BLD AUTO: 71.3 % — SIGNIFICANT CHANGE UP (ref 42.2–75.2)
NRBC # BLD: 0 /100 WBCS — SIGNIFICANT CHANGE UP (ref 0–0)
NT-PROBNP SERPL-SCNC: 75 PG/ML — SIGNIFICANT CHANGE UP (ref 0–300)
PLATELET # BLD AUTO: 207 K/UL — SIGNIFICANT CHANGE UP (ref 130–400)
PMV BLD: 10.6 FL — HIGH (ref 7.4–10.4)
POTASSIUM SERPL-MCNC: 4.4 MMOL/L — SIGNIFICANT CHANGE UP (ref 3.5–5)
POTASSIUM SERPL-SCNC: 4.4 MMOL/L — SIGNIFICANT CHANGE UP (ref 3.5–5)
PROT SERPL-MCNC: 6.9 G/DL — SIGNIFICANT CHANGE UP (ref 6–8)
RBC # BLD: 4.15 M/UL — LOW (ref 4.2–5.4)
RBC # FLD: 13.8 % — SIGNIFICANT CHANGE UP (ref 11.5–14.5)
SODIUM SERPL-SCNC: 140 MMOL/L — SIGNIFICANT CHANGE UP (ref 135–146)
TROPONIN T SERPL-MCNC: <0.01 NG/ML — SIGNIFICANT CHANGE UP
WBC # BLD: 18.06 K/UL — HIGH (ref 4.8–10.8)
WBC # FLD AUTO: 18.06 K/UL — HIGH (ref 4.8–10.8)

## 2023-07-18 PROCEDURE — 80053 COMPREHEN METABOLIC PANEL: CPT

## 2023-07-18 PROCEDURE — 93010 ELECTROCARDIOGRAM REPORT: CPT

## 2023-07-18 PROCEDURE — 99285 EMERGENCY DEPT VISIT HI MDM: CPT

## 2023-07-18 PROCEDURE — 71045 X-RAY EXAM CHEST 1 VIEW: CPT | Mod: 26

## 2023-07-18 PROCEDURE — 82962 GLUCOSE BLOOD TEST: CPT

## 2023-07-18 PROCEDURE — 36415 COLL VENOUS BLD VENIPUNCTURE: CPT

## 2023-07-18 PROCEDURE — 85025 COMPLETE CBC W/AUTO DIFF WBC: CPT

## 2023-07-18 PROCEDURE — 83880 ASSAY OF NATRIURETIC PEPTIDE: CPT

## 2023-07-18 PROCEDURE — 71045 X-RAY EXAM CHEST 1 VIEW: CPT

## 2023-07-18 PROCEDURE — 93005 ELECTROCARDIOGRAM TRACING: CPT

## 2023-07-18 PROCEDURE — 99285 EMERGENCY DEPT VISIT HI MDM: CPT | Mod: 25

## 2023-07-18 PROCEDURE — 84484 ASSAY OF TROPONIN QUANT: CPT

## 2023-07-18 NOTE — ED PROVIDER NOTE - OBJECTIVE STATEMENT
63-year-old female with past medical history of diabetes and hyperlipidemia who presents with shortness of breath.  Reported symptoms started about 2 weeks ago; shortness of breath is intermittent, nonexertional, and there is no alleviating or worsening factors.  Denies fever, chest pain, nausea, vomiting, abdominal pain, urinary symptoms, change with bowel movement.  Denies recent trauma, immobilization, surgical hysterectomy, and history of blood clot/cancer.

## 2023-07-18 NOTE — ED PROVIDER NOTE - CARE PLAN
Assessment and plan of treatment:	anxiety/sob  labs, imaging, ekg   1 Principal Discharge DX:	Shortness of breath  Assessment and plan of treatment:	anxiety/sob  labs, imaging, ekg

## 2023-07-18 NOTE — ED ADULT TRIAGE NOTE - CHIEF COMPLAINT QUOTE
Pt states she feels like she "isn't getting enough air" that comes and goes x 2 weeks. Denies chest pain

## 2023-07-18 NOTE — ED PROVIDER NOTE - ATTENDING APP SHARED VISIT CONTRIBUTION OF CARE
pt co feeling anxious and unable to "get a deep enough breath" around 3pm today which lasted a few min and resolved. has been asymptomatic since.  denies any form of pain/pressure/tightness.  denies feeling sob or diff breathing. is a current smoker.  no cough, fever, chills, ab pain, back pain, leg pain or swelling.    VITAL SIGNS: I have reviewed nursing notes and confirm.  CONSTITUTIONAL: Well-developed; well-nourished; in no acute distress.  SKIN: Skin exam is warm and dry, no acute rash.  HEAD: Normocephalic; atraumatic.  EYES: PERRL, EOM intact; conjunctiva and sclera clear.  ENT: No nasal discharge; airway clear.   NECK: Supple; non tender.  CARD:+ S1, S2   RESP: No wheezes, rales or rhonchi.  ABD: Normal bowel sounds; soft; non-distended; non-tender;  EXT: Normal ROM. No cyanosis or edema.  LYMPH: No acute adenopathy.  NEURO: Alert. Grossly unremarkable. No focal deficits.  PSYCH: Cooperative, appropriate.

## 2023-07-18 NOTE — ED PROVIDER NOTE - NSICDXFAMILYHX_GEN_ALL_CORE_FT
Consult received,chart reviewed. Will discuss with Dr. Sherryle Links and will advise. Thank you for consult. Discussed pt with Dr. Milagros Saleh. Pt has rehab potential but not medically stable d/t low H/H and work up for same.  Will follow Dr. Maty Millard on unit. Updated on improved H&H, SW note with no precert needed. Stated she will be an admission on Monday. Occupational Therapy  OCCUPATIONAL THERAPY INITIAL EVALUATION      Date:2018  Patient Name: Alvin Dave  MRN: [id number]  : 1945  Room: [address] [place], [place] [address]    Evaluating OT: Yulissa Ramos. Alejandra Sierra OTR/L #[id number]      AM-PAC Daily Activity Raw Score:   G-Code 8987: CK  Recommended Adaptive Equipment: ww, AE for LE dressing and bathing prn ,      Diagnosis: lumbar spondylolisthesis and radiculopathy   Surgery:  18 POSTERIOR LUMBAR INTERBODY FUSION L3-L4, L4-L5  WITH SCREWS, RODS, CAGES, BONE GRAFT, SSEP, CELL SAVER, STEPHANIE TABLE - GLOBUS (N/A )      KYPHOPLASTY  T12, L4  WITH BONE BIOPSY AND EPIDURAL INJECTION (N/A)  Pertinent Medical History:   Past Medical History:   Diagnosis Date    Arthritis         Precautions:  Falls, spinal precautions, LSO brace, safety     Home Living: Pt lives with   in a one story home  with ramp +1 step(s) to enter and no rail(s); bed/bath on main floor   Bathroom setup: walk in shower with seat  And standard commode   Equipment owned: none  Prior Level of Function:  Independent   with ADLs ,  Independent with IADLs; using no AD  for ambulation. Driving: yes                           Medication Management self  Occupation: walking     Pain Level: 5/10 B LE's  Cognition: A&O: 3/3; Follows 1-2 step directions   Memory:  fair    Sequencing:  fair    Problem solving:  fair    Judgement/safety:  fair      Functional Assessment:   Initial Eval Status  Date: 18 Treatment Status  Date: Short Term Goals  Treatment frequency: PRN 2-4 x/week   Feeding Independent        Grooming Min A   Mod I    UB Dressing Min A  Independent   LB Dressing dependent  SBA with AE     Bathing n/t  SBA with AE prn    Toileting Min A with ww  Mod I with ww   Bed Mobility  Supine to sit: min A    Sit to supine: min A   Supine to sit:  Independent   Sit to supine: Independent   Functional Transfers Sit to stand: min A   Stand to sit:  Min A   Stand pivot: min A   Mod I    Functional Mobility Min PO#4  1. Posterior lumbar interbody fusion L3-L4 and L4-L5 using screws, rods,  and cages. 2. Decompressive lumbar laminectomy L3-L4 and L4-L5. 3. Kyphoplasty, T12 and L4. Doing better every day.   Apparently will be discharged with Marcia Brizuela today  Follow up in office in 10-12 days  Spoke with family Patient refusing woodson removal at this time. Standing:  Min A  Sitting EOB:  Ind  Dynamic Standing: Mod Ind. Pt is A & O x 3  Sensation:  Pt denies numbness and tingling to extremities  Edema:  none    Patient education  Pt educated on  call light for safety , application of LSO with family watching and use of spinal precautions    Patient response to education:   Pt verbalized understanding Pt demonstrated skill Pt requires further education in this area   yes no yes     Comments:  Patient was seen this date for PT evaluation. Results of the functional assessment are noted above. Upon entering the room patient was found in supine position in bed. Family present and daughter observed session. Required Max A to don LSO with numerous cues. Patient then brought to EOB where she reported slight dizziness. Completed gait which required Mod A and use of fww to doorway and return. patietnn unsteady on her feet and required constant cueing. Following assessment patient up in bedside chair with family present. Family asked to not leave patient unattended and they confirmed they will not. RN made aware patient up in the bedside chair. Call light placed within reach and all lines were intact. This patient can benefit from the continuation of skilled PT  to maximize functional level and return to PLOF. Pts/ family goals   1. Return to home. Patient and or family understand(s) diagnosis, prognosis, and plan of care. yes    PLAN  PT care will be provided in accordance with the objectives noted above. Whenever appropriate, clear delegation orders will be provided for nursing staff. Exercises and functional mobility practice will be used as well as appropriate assistive devices or modalities to obtain goals. Patient and family education will also be administered as needed. Frequency of treatments: daily x 7-10 days.     Time in  [facility]  Time out  [address] [facility], [address] [facility] made fair progress towards set goals.    · Continue with current plan of care    Time in: Joel 178  Time out:1120  Total Tx Time: [address] [place]/L [id number] FAMILY HISTORY:  FH: type 2 diabetes, father    Father  Still living? Unknown  Family history of Bell's palsy, Age at diagnosis: Age Unknown

## 2023-07-18 NOTE — ED PROVIDER NOTE - CLINICAL SUMMARY MEDICAL DECISION MAKING FREE TEXT BOX
sob/anxiety, resolved  labs, imaging, ekg, supportive care   trop x2 neg.     asympt in ED entire visit  will dc home, outpt outpt follow up

## 2023-07-18 NOTE — ED PROVIDER NOTE - NSFOLLOWUPINSTRUCTIONS_ED_ALL_ED_FT
Please follow up with pulmonology and cardiology in 1-3 days     ****** Our Emergency Department Referral Coordinators will be reaching out to you in the next 24-48 hours from 9:00am to 5:00pm with a follow up appointment. Please expect a phone call from the hospital in that time frame. If you do not receive a call or if you have any questions or concerns, you can reach them at [phone]     Shortness of Breath    Shortness of breath means you have trouble breathing. It could also mean that you have a medical problem. You should get immediate medical care for shortness of breath.     CAUSES  Not enough oxygen in the air such as with high altitudes or a smoke-filled room.  Certain lung diseases, infections, or problems.  Heart disease or conditions, such as angina or heart failure.   Low red blood cells (anemia).  Poor physical fitness, which can cause shortness of breath when you exercise.  Chest or back injuries or stiffness.  Being overweight.  Smoking.   Anxiety, which can make you feel like you are not getting enough air.    DIAGNOSIS  Serious medical problems can often be found during your physical exam. Tests may also be done to determine why you are having shortness of breath. Tests may include:    Chest X-rays.   Lung function tests.   Blood tests.  An electrocardiogram (ECG).  An ambulatory electrocardiogram. An ambulatory ECG records your heartbeat patterns over a 24-hour period.   Exercise testing.  A transthoracic echocardiogram (TTE). During echocardiography, sound waves are used to evaluate how blood flows through your heart.   A transesophageal echocardiogram (ANTONIO).   Imaging scans.     Your health care provider may not be able to find a cause for your shortness of breath after your exam. In this case, it is important to have a follow-up exam with your health care provider as directed.     TREATMENT  Treatment for shortness of breath depends on the cause of your symptoms and can vary greatly.    HOME CARE INSTRUCTIONS  Do not smoke. Smoking is a common cause of shortness of breath. If you smoke, ask for help to quit.  Avoid being around chemicals or things that may bother your breathing, such as paint fumes and dust.  Rest as needed. Slowly resume your usual activities.  If medicines were prescribed, take them as directed for the full length of time directed. This includes oxygen and any inhaled medicines.  Keep all follow-up appointments as directed by your health care provider.    SEEK MEDICAL CARE IF:  Your condition does not improve in the time expected.  You have a hard time doing your normal activities even with rest.  You have any new symptoms.    SEEK IMMEDIATE MEDICAL CARE IF:  Your shortness of breath gets worse.  You feel light-headed, faint, or develop a cough not controlled with medicines.  You start coughing up blood.   You have pain with breathing.  You have chest pain or pain in your arms, shoulders, or abdomen.  You have a fever.  You are unable to walk up stairs or exercise the way you normally do.     MAKE SURE YOU:  Understand these instructions.  Will watch your condition.  Will get help right away if you are not doing well or get worse.    ADDITIONAL NOTES AND INSTRUCTIONS    Please follow up with your Primary MD in 24-48 hr.  Seek immediate medical care for any new/worsening signs or symptoms.

## 2023-07-18 NOTE — ED PROVIDER NOTE - CARE PROVIDER_API CALL
Quinn Khan  Cardiovascular Disease  [address] [facility] [facility] [address]  [place] [address]  Phone: [phone]  Fax: [phone]  Follow Up Time:     Hemanth Guadarrama  Pulmonary Disease  [address] [place] [address]  Phone: [phone]  Fax: [phone]  Follow Up Time:

## 2023-07-18 NOTE — ED ADULT NURSE NOTE - NSFALLUNIVINTERV_ED_ALL_ED
Bed/Stretcher in lowest position, wheels locked, appropriate side rails in place/Call bell, personal items and telephone in reach/Instruct patient to call for assistance before getting out of bed/chair/stretcher/Non-slip footwear applied when patient is off stretcher/[place] to call system/Physically safe environment - no spills, clutter or unnecessary equipment/Purposeful proactive rounding/Room/bathroom lighting operational, light cord in reach

## 2023-07-19 LAB — TROPONIN T SERPL-MCNC: <0.01 NG/ML — SIGNIFICANT CHANGE UP

## 2023-07-19 PROCEDURE — 93010 ELECTROCARDIOGRAM REPORT: CPT

## 2025-07-08 ENCOUNTER — EMERGENCY (EMERGENCY)
Facility: HOSPITAL | Age: 65
LOS: 0 days | Discharge: HOME | End: 2025-07-09
Attending: EMERGENCY MEDICINE
Payer: COMMERCIAL

## 2025-07-08 VITALS
TEMPERATURE: 99 F | DIASTOLIC BLOOD PRESSURE: 69 MMHG | WEIGHT: 171.96 LBS | HEART RATE: 91 BPM | RESPIRATION RATE: 18 BRPM | OXYGEN SATURATION: 98 % | HEIGHT: 64 IN | SYSTOLIC BLOOD PRESSURE: 151 MMHG

## 2025-07-08 DIAGNOSIS — E78.5 HYPERLIPIDEMIA, UNSPECIFIED: ICD-10-CM

## 2025-07-08 DIAGNOSIS — E03.9 HYPOTHYROIDISM, UNSPECIFIED: ICD-10-CM

## 2025-07-08 DIAGNOSIS — Z79.84 LONG TERM (CURRENT) USE OF ORAL HYPOGLYCEMIC DRUGS: ICD-10-CM

## 2025-07-08 DIAGNOSIS — E11.65 TYPE 2 DIABETES MELLITUS WITH HYPERGLYCEMIA: ICD-10-CM

## 2025-07-08 DIAGNOSIS — I10 ESSENTIAL (PRIMARY) HYPERTENSION: ICD-10-CM

## 2025-07-08 DIAGNOSIS — F32.A DEPRESSION, UNSPECIFIED: ICD-10-CM

## 2025-07-08 PROCEDURE — 85014 HEMATOCRIT: CPT

## 2025-07-08 PROCEDURE — 84132 ASSAY OF SERUM POTASSIUM: CPT

## 2025-07-08 PROCEDURE — 83605 ASSAY OF LACTIC ACID: CPT

## 2025-07-08 PROCEDURE — 81001 URINALYSIS AUTO W/SCOPE: CPT

## 2025-07-08 PROCEDURE — 99283 EMERGENCY DEPT VISIT LOW MDM: CPT | Mod: 25

## 2025-07-08 PROCEDURE — 82330 ASSAY OF CALCIUM: CPT

## 2025-07-08 PROCEDURE — 84295 ASSAY OF SERUM SODIUM: CPT

## 2025-07-08 PROCEDURE — 36415 COLL VENOUS BLD VENIPUNCTURE: CPT

## 2025-07-08 PROCEDURE — 82803 BLOOD GASES ANY COMBINATION: CPT

## 2025-07-08 PROCEDURE — 36000 PLACE NEEDLE IN VEIN: CPT

## 2025-07-08 PROCEDURE — 87086 URINE CULTURE/COLONY COUNT: CPT

## 2025-07-08 PROCEDURE — 82010 KETONE BODYS QUAN: CPT

## 2025-07-08 PROCEDURE — 80053 COMPREHEN METABOLIC PANEL: CPT

## 2025-07-08 PROCEDURE — 82962 GLUCOSE BLOOD TEST: CPT

## 2025-07-08 PROCEDURE — 96372 THER/PROPH/DIAG INJ SC/IM: CPT

## 2025-07-08 PROCEDURE — 85018 HEMOGLOBIN: CPT

## 2025-07-08 PROCEDURE — 85025 COMPLETE CBC W/AUTO DIFF WBC: CPT

## 2025-07-08 PROCEDURE — 99285 EMERGENCY DEPT VISIT HI MDM: CPT

## 2025-07-08 NOTE — ED ADULT TRIAGE NOTE - CHIEF COMPLAINT QUOTE
My blood sugar is high for me 406. Im only on oral meds. I don't feel good  - patient  Patient reports she ate a flying saucer ice cream sandwich at 8 PM . took her Metformin afterwards.

## 2025-07-09 LAB
ALBUMIN SERPL ELPH-MCNC: 4.2 G/DL — SIGNIFICANT CHANGE UP (ref 3.5–5.2)
ALP SERPL-CCNC: 100 U/L — SIGNIFICANT CHANGE UP (ref 30–115)
ALT FLD-CCNC: 16 U/L — SIGNIFICANT CHANGE UP (ref 0–41)
ANION GAP SERPL CALC-SCNC: 16 MMOL/L — HIGH (ref 7–14)
APPEARANCE UR: ABNORMAL
AST SERPL-CCNC: 14 U/L — SIGNIFICANT CHANGE UP (ref 0–41)
B-OH-BUTYR SERPL-SCNC: 0.3 MMOL/L — SIGNIFICANT CHANGE UP
BACTERIA # UR AUTO: ABNORMAL /HPF
BASE EXCESS BLDV CALC-SCNC: -2 MMOL/L — SIGNIFICANT CHANGE UP (ref -2–3)
BASE EXCESS BLDV CALC-SCNC: -3.2 MMOL/L — LOW (ref -2–3)
BASOPHILS # BLD AUTO: 0.06 K/UL — SIGNIFICANT CHANGE UP (ref 0–0.2)
BASOPHILS NFR BLD AUTO: 0.5 % — SIGNIFICANT CHANGE UP (ref 0–1)
BILIRUB SERPL-MCNC: 0.3 MG/DL — SIGNIFICANT CHANGE UP (ref 0.2–1.2)
BILIRUB UR-MCNC: NEGATIVE — SIGNIFICANT CHANGE UP
BUN SERPL-MCNC: 25 MG/DL — HIGH (ref 10–20)
CA-I SERPL-SCNC: 1.23 MMOL/L — SIGNIFICANT CHANGE UP (ref 1.15–1.33)
CALCIUM SERPL-MCNC: 9.3 MG/DL — SIGNIFICANT CHANGE UP (ref 8.4–10.5)
CAST: 0 /LPF — SIGNIFICANT CHANGE UP (ref 0–4)
CHLORIDE SERPL-SCNC: 99 MMOL/L — SIGNIFICANT CHANGE UP (ref 98–110)
CO2 SERPL-SCNC: 21 MMOL/L — SIGNIFICANT CHANGE UP (ref 17–32)
COLOR SPEC: YELLOW — SIGNIFICANT CHANGE UP
CREAT SERPL-MCNC: 0.9 MG/DL — SIGNIFICANT CHANGE UP (ref 0.7–1.5)
DIFF PNL FLD: NEGATIVE — SIGNIFICANT CHANGE UP
EGFR: 71 ML/MIN/1.73M2 — SIGNIFICANT CHANGE UP
EGFR: 71 ML/MIN/1.73M2 — SIGNIFICANT CHANGE UP
EOSINOPHIL # BLD AUTO: 0.21 K/UL — SIGNIFICANT CHANGE UP (ref 0–0.7)
EOSINOPHIL NFR BLD AUTO: 1.6 % — SIGNIFICANT CHANGE UP (ref 0–8)
GAS PNL BLDV: 133 MMOL/L — LOW (ref 136–145)
GAS PNL BLDV: 133 MMOL/L — LOW (ref 136–145)
GAS PNL BLDV: SIGNIFICANT CHANGE UP
GAS PNL BLDV: SIGNIFICANT CHANGE UP
GLUCOSE SERPL-MCNC: 344 MG/DL — HIGH (ref 70–99)
GLUCOSE UR QL: >=1000 MG/DL
HCO3 BLDV-SCNC: 23 MMOL/L — SIGNIFICANT CHANGE UP (ref 22–29)
HCO3 BLDV-SCNC: 24 MMOL/L — SIGNIFICANT CHANGE UP (ref 22–29)
HCT VFR BLD CALC: 35.2 % — LOW (ref 37–47)
HCT VFR BLDA CALC: 38 % — SIGNIFICANT CHANGE UP (ref 34.5–46.5)
HGB BLD CALC-MCNC: 12.7 G/DL — SIGNIFICANT CHANGE UP (ref 11.7–16.1)
HGB BLD-MCNC: 12.3 G/DL — SIGNIFICANT CHANGE UP (ref 12–16)
IMM GRANULOCYTES NFR BLD AUTO: 0.9 % — HIGH (ref 0.1–0.3)
KETONES UR QL: NEGATIVE MG/DL — SIGNIFICANT CHANGE UP
LACTATE BLDV-MCNC: 3.1 MMOL/L — HIGH (ref 0.5–2)
LACTATE BLDV-MCNC: 3.6 MMOL/L — HIGH (ref 0.5–2)
LEUKOCYTE ESTERASE UR-ACNC: ABNORMAL
LYMPHOCYTES # BLD AUTO: 19.3 % — LOW (ref 20.5–51.1)
LYMPHOCYTES # BLD AUTO: 2.55 K/UL — SIGNIFICANT CHANGE UP (ref 1.2–3.4)
MCHC RBC-ENTMCNC: 31.7 PG — HIGH (ref 27–31)
MCHC RBC-ENTMCNC: 34.9 G/DL — SIGNIFICANT CHANGE UP (ref 32–37)
MCV RBC AUTO: 90.7 FL — SIGNIFICANT CHANGE UP (ref 81–99)
MONOCYTES # BLD AUTO: 0.8 K/UL — HIGH (ref 0.1–0.6)
MONOCYTES NFR BLD AUTO: 6.1 % — SIGNIFICANT CHANGE UP (ref 1.7–9.3)
NEUTROPHILS # BLD AUTO: 9.46 K/UL — HIGH (ref 1.4–6.5)
NEUTROPHILS NFR BLD AUTO: 71.6 % — SIGNIFICANT CHANGE UP (ref 42.2–75.2)
NITRITE UR-MCNC: NEGATIVE — SIGNIFICANT CHANGE UP
NRBC BLD AUTO-RTO: 0 /100 WBCS — SIGNIFICANT CHANGE UP (ref 0–0)
PCO2 BLDV: 43 MMHG — HIGH (ref 39–42)
PCO2 BLDV: 43 MMHG — HIGH (ref 39–42)
PH BLDV: 7.33 — SIGNIFICANT CHANGE UP (ref 7.32–7.43)
PH BLDV: 7.35 — SIGNIFICANT CHANGE UP (ref 7.32–7.43)
PH UR: 6 — SIGNIFICANT CHANGE UP (ref 5–8)
PLATELET # BLD AUTO: 202 K/UL — SIGNIFICANT CHANGE UP (ref 130–400)
PMV BLD: 10.9 FL — HIGH (ref 7.4–10.4)
PO2 BLDV: 40 MMHG — SIGNIFICANT CHANGE UP (ref 25–45)
PO2 BLDV: 42 MMHG — SIGNIFICANT CHANGE UP (ref 25–45)
POTASSIUM BLDV-SCNC: 4.6 MMOL/L — SIGNIFICANT CHANGE UP (ref 3.5–5.1)
POTASSIUM BLDV-SCNC: 4.6 MMOL/L — SIGNIFICANT CHANGE UP (ref 3.5–5.1)
POTASSIUM SERPL-MCNC: 4.6 MMOL/L — SIGNIFICANT CHANGE UP (ref 3.5–5)
POTASSIUM SERPL-SCNC: 4.6 MMOL/L — SIGNIFICANT CHANGE UP (ref 3.5–5)
PROT SERPL-MCNC: 7.2 G/DL — SIGNIFICANT CHANGE UP (ref 6–8)
PROT UR-MCNC: 30 MG/DL
RBC # BLD: 3.88 M/UL — LOW (ref 4.2–5.4)
RBC # FLD: 13.9 % — SIGNIFICANT CHANGE UP (ref 11.5–14.5)
RBC CASTS # UR COMP ASSIST: 3 /HPF — SIGNIFICANT CHANGE UP (ref 0–4)
SAO2 % BLDV: 72.7 % — SIGNIFICANT CHANGE UP (ref 67–88)
SAO2 % BLDV: 73.5 % — SIGNIFICANT CHANGE UP (ref 67–88)
SODIUM SERPL-SCNC: 136 MMOL/L — SIGNIFICANT CHANGE UP (ref 135–146)
SP GR SPEC: 1.02 — SIGNIFICANT CHANGE UP (ref 1–1.03)
SQUAMOUS # UR AUTO: 13 /HPF — HIGH (ref 0–5)
UROBILINOGEN FLD QL: 0.2 MG/DL — SIGNIFICANT CHANGE UP (ref 0.2–1)
WBC # BLD: 13.2 K/UL — HIGH (ref 4.8–10.8)
WBC # FLD AUTO: 13.2 K/UL — HIGH (ref 4.8–10.8)
WBC UR QL: 42 /HPF — HIGH (ref 0–5)

## 2025-07-09 RX ORDER — SODIUM CHLORIDE 9 G/1000ML
1000 INJECTION, SOLUTION INTRAVENOUS ONCE
Refills: 0 | Status: COMPLETED | OUTPATIENT
Start: 2025-07-09 | End: 2025-07-09

## 2025-07-09 RX ADMIN — Medication 1000 MILLILITER(S): at 00:34

## 2025-07-09 RX ADMIN — Medication 3 UNIT(S): at 04:14

## 2025-07-09 RX ADMIN — SODIUM CHLORIDE 1000 MILLILITER(S): 9 INJECTION, SOLUTION INTRAVENOUS at 01:28

## 2025-07-09 NOTE — ED PROVIDER NOTE - CARE PROVIDER_API CALL
Noni Romero  Internal Medicine  [address] [place] [address]  Phone: [phone]  Fax: [phone]  Established Patient  Follow Up Time: 7-10 Days    Timoteo Morgan  Endocrinology Diabetes and Metabolism  [address] [place] [address]  Phone: [phone]  Fax: [phone]  Follow Up Time: 7-10 Days

## 2025-07-09 NOTE — ED PROVIDER NOTE - PROVIDER TOKENS
PROVIDER:[TOKEN:[[id number]],FOLLOWUP:[7-10 Days],ESTABLISHEDPATIENT:[T]],PROVIDER:[TOKEN:[[id number]],FOLLOWUP:[7-10 Days]]

## 2025-07-09 NOTE — ED PROVIDER NOTE - CLINICAL SUMMARY MEDICAL DECISION MAKING FREE TEXT BOX
Labs, EKG and imaging were ordered, where indicated.  Independent interpretation of any labs, EKG & imaging that was ordered was performed by me, Dr. Lewis. Appropriate medications for patient's presenting complaints were ordered and effects were reassessed, where indicated.  Patient's records (prior hospital, ED visit, and/or nursing home note) were reviewed, if available.  Additional history was obtained from EMS, family, and/or PCP (where available).  Escalation to admission/observation was considered.  However patient feels much better and patient/parent is comfortable with discharge.  Appropriate follow-up was arranged.     hyperglycemia after carb/sugar intake, no dka - ivf given, BG improved, LA elevated in 3s x 2, pt denies any recent f/c or infection, possibly 2/2 metformin use? - pt reassessed, feeling better, eager for discharge-  all results d/w pt & copies given, strict return precautions discussed, rec outpt PCP f/u, as well as Endo f/u, for further DM mgmt Labs, EKG and imaging were ordered, where indicated.  Independent interpretation of any labs, EKG & imaging that was ordered was performed by me, Dr. Lewis. Appropriate medications for patient's presenting complaints were ordered and effects were reassessed, where indicated.  Patient's records (prior hospital, ED visit, and/or nursing home note) were reviewed, if available.  Additional history was obtained from EMS, family, and/or PCP (where available).  Escalation to admission/observation was considered.  However patient feels much better and patient/parent is comfortable with discharge.  Appropriate follow-up was arranged.     hyperglycemia after carb/sugar intake, no dka - ivf given, BG improved after ivf/reg ins sq, LA elevated in 3s x 2, pt denies any recent f/c or infection, possibly 2/2 metformin use? - pt reassessed, feeling better, eager for discharge-  all results d/w pt & copies given, strict return precautions discussed, rec outpt PCP f/u, as well as Endo f/u, for further DM mgmt

## 2025-07-09 NOTE — ED PROVIDER NOTE - PATIENT PORTAL LINK FT
You can access the FollowMyHealth Patient Portal offered by [facility] by registering at the following website: [email or web address]. By joining [employer]’s FollowMyHealth portal, you will also be able to view your health information using other applications (apps) compatible with our system. You can access the FollowMyHealth Patient Portal offered by [facility] by registering at the following website: [email or web address]. By joining [email or web address]’s FollowMyHealth portal, you will also be able to view your health information using other applications (apps) compatible with our system.

## 2025-07-09 NOTE — ED ADULT NURSE NOTE - NSFALLUNIVINTERV_ED_ALL_ED
Bed/Stretcher in lowest position, wheels locked, appropriate side rails in place/Call bell, personal items and telephone in reach/[place] to call system/Physically safe environment - no spills, clutter or unnecessary equipment/Purposeful proactive rounding/Room/bathroom lighting operational, light cord in reach

## 2025-07-09 NOTE — ED PROVIDER NOTE - NSFOLLOWUPINSTRUCTIONS_ED_ALL_ED_FT
Our Emergency Department Referral Coordinators will be reaching out to you in the next 24-48 hours from 9:00am to 5:00pm to schedule a follow up appointment. Please expect a phone call from the hospital in that time frame. If you do not receive a call or if you have any questions or concerns, you can reach them at [phone].    Hyperglycemia    Hyperglycemia occurs when the glucose (a sugar) level in your blood is too high. Symptoms include increased urination, increased thirst, a dry mouth, or changes in appetite. If started on a medication, take exactly as prescribed by your health care professional. Maintain a healthy lifestyle and follow up with your primary care physician.    SEEK IMMEDIATE MEDICAL CARE IF YOU HAVE THE FOLLOWING SYMPTOMS: shortness of breath, change in mental status, nausea or vomiting, fruity smell to your breath, or any signs of dehydration.

## 2025-07-09 NOTE — ED PROVIDER NOTE - PHYSICAL EXAMINATION
CONSTITUTIONAL: Well-appearing; well-nourished; in no apparent distress.   EYES: PERRL; EOM intact.   ENT: normal nose; no rhinorrhea; normal pharynx with no tonsillar hypertrophy.   NECK: Supple; non-tender; no cervical lymphadenopathy.  CARDIOVASCULAR: Normal S1, S2; no murmurs, rubs, or gallops.   RESPIRATORY: Normal chest excursion with respiration; breath sounds clear and equal bilaterally; no wheezes, rhonchi, or rales.  GI/: Normal bowel sounds; non-distended; non-tender; no palpable organomegaly.   MS: No evidence of trauma or deformity.  Normal ROM in all four extremities; non-tender to palpation; distal pulses are normal.   SKIN: Normal for age and race; warm; dry; good turgor; no apparent lesions or exudate.   NEURO/PSYCH: A & O x 4; grossly unremarkable. mood and manner are appropriate. Grooming and personal hygiene are appropriate. No apparent thoughts of harm to self or others.

## 2025-07-09 NOTE — ED PROVIDER NOTE - ATTENDING APP SHARED VISIT CONTRIBUTION OF CARE
65-year-old female PMH DM on metformin and glipizide, HL presenting with hyperglycemia.  Patient admits to eating carbs and sugary foods today, checked her blood glucose and found in 400s.  States she did not feel well, so came to ED.  Denies any specific symptoms.  No F/C, URI symptoms, CP/SOB, cough, NVD, abdominal pain, flank pain, urinary symptoms, rash.    PE:  nad  skin warm, dry  ncat  neck supple  rrr nl s1s2 no mrg  ctab no wrr  abd soft ntnd no palpable masses no rgr  back non-tender no cvat  ext no cce dpi  neuro aaox3 grossly nf exam

## 2025-07-09 NOTE — ED PROVIDER NOTE - OBJECTIVE STATEMENT
65 year old F with hx of DM (on metformin/glipizide), HTN, HLD, depression, hypothyroid presenting to ed for eval pf elevated blood sugar. Pt sts she checked her FS after eating pasta and ice cream today and was found to be in the 400s. Sts her legs felt weak at the time. Sts feeling better in the ed. She denies any recent illness, fever/chills, cough, chest pain, sob, abd pain, nausea, vomiting, urinary symptoms.

## 2025-07-09 NOTE — ED ADULT NURSE NOTE - CHIEF COMPLAINT QUOTE
I was called to the bedside to pronounce that patient in room 812 had .  No spontaneous movements were present.  There was no response to verbal or tactile stimuli.  Pupils were dilated and fixed.  Corneal reflex absent.  No breath sounds were auscultated.  No carotid pulse was palpable.  No heart sounds were auscultated over entire precordium. Patient pronounced at 17:30 on 25.    Marisol Orozco DO PGY-1 Internal Medicine     My blood sugar is high for me 406. Im only on oral meds. I don't feel good  - patient  Patient reports she ate a flying saucer ice cream sandwich at 8 PM . took her Metformin afterwards.

## 2025-07-10 LAB
CULTURE RESULTS: SIGNIFICANT CHANGE UP
SPECIMEN SOURCE: SIGNIFICANT CHANGE UP

## 2025-07-10 NOTE — CHART NOTE - NSCHARTNOTEFT_GEN_A_CORE
left vm 7/9 - DK / left vm 7/10 - DK (Endo Referral) left vm 7/9 - DK / left vm 7/10 - DK. Prefers mornings, sent to [facility] 7/10 - DK / as per sophie Shaw full, unable to leave message 7/11 - DK (Endo Referral) left vm 7/9 - DK / left vm 7/10 - DK. Prefers mornings, sent to [facility] 7/10 - DK / as per sophie Shaw full, unable to leave message 7/11 - DK / appt scheduled on 8/19 WITH [facility] at 10:45am 7/18 - DK (Endo Referral)

## 2025-07-22 NOTE — ED PROVIDER NOTE - PATIENT PORTAL LINK FT
Male
You can access the FollowMyHealth Patient Portal offered by [facility] by registering at the following website: [email or web address]. By joining [employer]’s FollowMyHealth portal, you will also be able to view your health information using other applications (apps) compatible with our system.

## 2025-08-26 DIAGNOSIS — Z83.3 FAMILY HISTORY OF DIABETES MELLITUS: ICD-10-CM

## 2025-08-26 DIAGNOSIS — Z82.49 FAMILY HISTORY OF ISCHEMIC HEART DISEASE AND OTHER DISEASES OF THE CIRCULATORY SYSTEM: ICD-10-CM

## 2025-08-28 ENCOUNTER — APPOINTMENT (OUTPATIENT)
Dept: NEPHROLOGY | Facility: CLINIC | Age: 65
End: 2025-08-28
Payer: COMMERCIAL

## 2025-08-28 VITALS
BODY MASS INDEX: 28 KG/M2 | OXYGEN SATURATION: 98 % | HEART RATE: 101 BPM | WEIGHT: 164 LBS | DIASTOLIC BLOOD PRESSURE: 62 MMHG | HEIGHT: 64 IN | SYSTOLIC BLOOD PRESSURE: 126 MMHG

## 2025-08-28 DIAGNOSIS — R80.9 PROTEINURIA, UNSPECIFIED: ICD-10-CM

## 2025-08-28 DIAGNOSIS — E11.29 TYPE 2 DIABETES MELLITUS WITH OTHER DIABETIC KIDNEY COMPLICATION: ICD-10-CM

## 2025-08-28 DIAGNOSIS — R80.9 TYPE 2 DIABETES MELLITUS WITH OTHER DIABETIC KIDNEY COMPLICATION: ICD-10-CM

## 2025-08-28 DIAGNOSIS — E78.1 PURE HYPERGLYCERIDEMIA: ICD-10-CM

## 2025-08-28 DIAGNOSIS — E78.5 HYPERLIPIDEMIA, UNSPECIFIED: ICD-10-CM

## 2025-08-28 DIAGNOSIS — I10 ESSENTIAL (PRIMARY) HYPERTENSION: ICD-10-CM

## 2025-08-28 LAB
BILIRUB UR QL STRIP: NORMAL
CLARITY UR: CLEAR
COLLECTION METHOD: NORMAL
GLUCOSE UR-MCNC: 250
HCG UR QL: 0.2 EU/DL
HGB UR QL STRIP.AUTO: NORMAL
KETONES UR-MCNC: NORMAL
LEUKOCYTE ESTERASE UR QL STRIP: NORMAL
NITRITE UR QL STRIP: NORMAL
PH UR STRIP: 5.5
PROT UR STRIP-MCNC: 100
SP GR UR STRIP: 1.02

## 2025-08-28 PROCEDURE — 99204 OFFICE O/P NEW MOD 45 MIN: CPT

## 2025-08-28 PROCEDURE — 81002 URINALYSIS NONAUTO W/O SCOPE: CPT

## 2025-08-28 RX ORDER — PSYLLIUM HUSK 0.4 G
CAPSULE ORAL TWICE DAILY
Refills: 0 | Status: ACTIVE | COMMUNITY

## 2025-08-28 RX ORDER — EMPAGLIFLOZIN 10 MG/1
10 TABLET, FILM COATED ORAL
Refills: 0 | Status: ACTIVE | COMMUNITY